# Patient Record
Sex: FEMALE | Race: WHITE | NOT HISPANIC OR LATINO | Employment: FULL TIME
[De-identification: names, ages, dates, MRNs, and addresses within clinical notes are randomized per-mention and may not be internally consistent; named-entity substitution may affect disease eponyms.]

---

## 2017-03-06 DIAGNOSIS — N95.1 SYMPTOMATIC MENOPAUSAL OR FEMALE CLIMACTERIC STATES: ICD-10-CM

## 2017-03-06 RX ORDER — MEDROXYPROGESTERONE ACETATE 2.5 MG/1
2.5 TABLET ORAL DAILY
Qty: 90 TABLET | Refills: 0 | Status: SHIPPED | OUTPATIENT
Start: 2017-03-06 | End: 2017-06-06

## 2017-03-06 RX ORDER — ESTRADIOL 1 MG/1
1 TABLET ORAL DAILY
Qty: 90 TABLET | Refills: 0 | Status: SHIPPED | OUTPATIENT
Start: 2017-03-06 | End: 2017-06-06

## 2017-05-27 ENCOUNTER — HEALTH MAINTENANCE LETTER (OUTPATIENT)
Age: 57
End: 2017-05-27

## 2017-06-06 DIAGNOSIS — N95.1 SYMPTOMATIC MENOPAUSAL OR FEMALE CLIMACTERIC STATES: ICD-10-CM

## 2017-06-06 RX ORDER — MEDROXYPROGESTERONE ACETATE 2.5 MG/1
2.5 TABLET ORAL DAILY
Qty: 30 TABLET | Refills: 0 | Status: SHIPPED | OUTPATIENT
Start: 2017-06-06 | End: 2017-06-16

## 2017-06-06 RX ORDER — ESTRADIOL 1 MG/1
1 TABLET ORAL DAILY
Qty: 30 TABLET | Refills: 0 | Status: SHIPPED | OUTPATIENT
Start: 2017-06-06 | End: 2017-06-16

## 2017-06-06 NOTE — TELEPHONE ENCOUNTER
Signed Prescriptions:                        Disp   Refills    estradiol (ESTRACE) 1 MG tablet            30 tab*0        Sig: Take 1 tablet (1 mg) by mouth daily  Authorizing Provider: MARCE CUMMINGS  Ordering User: KASHMIR CALVO    medroxyPROGESTERone (PROVERA) 2.5 MG [id number] tab*0        Sig: Take 1 tablet (2.5 mg) by mouth daily  Authorizing Provider: MARCE CUMMINGS  Ordering User: KASHMIR CALVO      Patient called asking for refill. Per note from SL in Dec 2016, pt was to schedule an AE in 6 months. Pt scheduled to see ML on 6/16. Refill sent for 30 days.   Kashmir Calvo, RN-BSN

## 2017-06-16 ENCOUNTER — OFFICE VISIT (OUTPATIENT)
Dept: MIDWIFE SERVICES | Facility: CLINIC | Age: 57
End: 2017-06-16
Payer: COMMERCIAL

## 2017-06-16 VITALS
OXYGEN SATURATION: 100 % | SYSTOLIC BLOOD PRESSURE: 117 MMHG | HEIGHT: 59 IN | BODY MASS INDEX: 24.35 KG/M2 | DIASTOLIC BLOOD PRESSURE: 80 MMHG | WEIGHT: 120.8 LBS | TEMPERATURE: 97.5 F | HEART RATE: 61 BPM

## 2017-06-16 DIAGNOSIS — R53.83 FATIGUE, UNSPECIFIED TYPE: ICD-10-CM

## 2017-06-16 DIAGNOSIS — M79.671 PAIN IN BOTH FEET: ICD-10-CM

## 2017-06-16 DIAGNOSIS — Z01.419 WELL WOMAN EXAM: Primary | ICD-10-CM

## 2017-06-16 DIAGNOSIS — M79.672 PAIN IN BOTH FEET: ICD-10-CM

## 2017-06-16 DIAGNOSIS — Z12.31 ENCOUNTER FOR SCREENING MAMMOGRAM FOR BREAST CANCER: ICD-10-CM

## 2017-06-16 DIAGNOSIS — N95.1 SYMPTOMATIC MENOPAUSAL OR FEMALE CLIMACTERIC STATES: ICD-10-CM

## 2017-06-16 LAB
ERYTHROCYTE [DISTWIDTH] IN BLOOD BY AUTOMATED COUNT: 12.9 % (ref 10–15)
HCT VFR BLD AUTO: 43.9 % (ref 35–47)
HGB BLD-MCNC: 14.7 G/DL (ref 11.7–15.7)
MCH RBC QN AUTO: 30.2 PG (ref 26.5–33)
MCHC RBC AUTO-ENTMCNC: 33.5 G/DL (ref 31.5–36.5)
MCV RBC AUTO: 90 FL (ref 78–100)
PLATELET # BLD AUTO: 201 10E9/L (ref 150–450)
RBC # BLD AUTO: 4.86 10E12/L (ref 3.8–5.2)
TSH SERPL DL<=0.005 MIU/L-ACNC: 0.98 MU/L (ref 0.4–4)
VIT B12 SERPL-MCNC: 546 PG/ML (ref 193–986)
WBC # BLD AUTO: 5.2 10E9/L (ref 4–11)

## 2017-06-16 PROCEDURE — 84443 ASSAY THYROID STIM HORMONE: CPT | Performed by: ADVANCED PRACTICE MIDWIFE

## 2017-06-16 PROCEDURE — 82607 VITAMIN B-12: CPT | Performed by: ADVANCED PRACTICE MIDWIFE

## 2017-06-16 PROCEDURE — 99396 PREV VISIT EST AGE 40-64: CPT | Performed by: ADVANCED PRACTICE MIDWIFE

## 2017-06-16 PROCEDURE — 36415 COLL VENOUS BLD VENIPUNCTURE: CPT | Performed by: ADVANCED PRACTICE MIDWIFE

## 2017-06-16 PROCEDURE — 85027 COMPLETE CBC AUTOMATED: CPT | Performed by: ADVANCED PRACTICE MIDWIFE

## 2017-06-16 RX ORDER — MEDROXYPROGESTERONE ACETATE 2.5 MG/1
2.5 TABLET ORAL DAILY
Qty: 90 TABLET | Refills: 3 | Status: SHIPPED | OUTPATIENT
Start: 2017-06-16 | End: 2018-06-22

## 2017-06-16 RX ORDER — ESTRADIOL 1 MG/1
1 TABLET ORAL DAILY
Qty: 90 TABLET | Refills: 3 | Status: SHIPPED | OUTPATIENT
Start: 2017-06-16 | End: 2018-06-22

## 2017-06-16 NOTE — PROGRESS NOTES
"Chief Complaint   Patient presents with     Physical       Initial /80 (BP Location: Left arm, Patient Position: Sitting, Cuff Size: Adult Regular)  Pulse 61  Temp 97.5  F (36.4  C) (Oral)  Ht 4' 10.75\" (1.492 m)  Wt 120 lb 12.8 oz (54.8 kg)  LMP 2011  SpO2 100%  Breastfeeding? No  BMI 24.61 kg/m2 Estimated body mass index is 24.61 kg/(m^2) as calculated from the following:    Height as of this encounter: 4' 10.75\" (1.492 m).    Weight as of this encounter: 120 lb 12.8 oz (54.8 kg).  BP completed using cuff size: regular        The following HM Due: mammogram      The following patient reported/Care Every where data was sent to:  P ABSTRACT QUALITY INITIATIVES [06092]  n/a     patient has appointment for today and orders have been placed    Christine is a 57 year old  who presents for annual exam.   Postmenopausal since 52.  She is having hot flashes, mild and decreased mental sharpness. No vaginal bleeding noted.     Besides routine health maintenance,  she would like to discuss issues since a surgery 5 years ago/ B12 levels, diaphragm cramping when bending over. She reports fatigue ever since a tummy tuck and breast enhancement 5 years ago. Reports she stopped exercising after her surgery and hasn't \"gotten back on the wagon\" yet. She does walk her dogs but has trouble with foot pain that sometimes radiates up her leg and she notices opposite side hip pain, mostly in the morning when getting out of bed.  Her sister had low B12 after a surgery and she is requesting that we check that today. She would like a refill of HRT.  GYNECOLOGIC HISTORY:  Menarche: 13   Age at first intercourse: 20 Number of lifetime partners: more than 6  She is not currently sexually active with male partner(s) and she is currently in monogamous relationship with .    History sexually transmitted infections:Chlamydia, Gonorrhea and Herpes, BV  STI testing offered?  Declined  Estrogen replacement therapy: " Yes -  Oral  ADILENE exposure: No    History of abnormal Pap smear: YES - updated in Problem List and Health Maintenance accordingly  Family history of breast CA: Yes (Please explain): maternal aunt  Family history of uterine/ovarian CA: No  Family history of colon CA: No    HEALTH MAINTENANCE:  Cholesterol: (No components found for: CHOL2 ) History of abnormal lipids: No  Mammo: 2015 . History of abnormal Mammo: No  Regular Self Breast Exams: No  Colonoscopy: 2012 History of abnormal Colonoscopy: No  Dexa: 2015 History of abnormal Dexa: No  Calcium/Vitamin D intake: source:  dairy, salads, some nuts Adequate? Yes  TSH: (No components found for: TSH1 )  Pap; (  Lab Results   Component Value Date    PAP NIL 2015    PAP NIL 2012    PAP NIL 2011      HISTORY:  Obstetric History       T4      L4     SAB0   TAB0   Ectopic0   Multiple0   Live Births0       # Outcome Date GA Lbr Juan Pablo/2nd Weight Sex Delivery Anes PTL Lv   5 Term 07/15/91 40w0d       MEENA   4 Term 08/10/90 40w0d    CS   MEENA   3 Term 84 40w0d       MEENA   2 Term 10/02/82 40w0d       MEENA   1 SAB 80     SAB   DEC        Past Medical History:   Diagnosis Date     ASCUS on Pap smear     + HPV , colp WNL     Cervical high risk human papillomavirus (HPV) DNA test positive     Unable to type     Herpes simplex without mention of complication     no recent outbreaks     Other and unspecified hyperlipidemia     elevated chol, ratio WNL     Papanicolaou smear of cervix with atypical squamous cells of undetermined significance (ASC-US)     +Endometrial cells  and 2002 US neg     Varicella without mention of complication     Chickenpox     Past Surgical History:   Procedure Laterality Date     ABDOMINOPLASTY       C NONSPECIFIC PROCEDURE  82,84,91    Vaginal delivery x3     C NONSPECIFIC PROCEDURE           C NONSPECIFIC PROCEDURE  81    SAB     COLONOSCOPY  14     negative, repeat in 10 years     hernia  age 3 or 4    hernia repair     MAMMOPLASTY AUGMENTATION BILATERAL       Family History   Problem Relation Age of Onset     Respiratory Mother      emphysema     Eye Disorder Mother      glaucoma     Psychotic Disorder Mother      depression     Depression Mother      Hypertension Father      CANCER Father      pancreatic CA-  72     Other - See Comments Sister      fibromyalgia     DIABETES Maternal Grandfather      GASTROINTESTINAL DISEASE Daughter      ulcers     Breast Cancer Maternal Aunt      Unsure what age or outcome     Alcohol/Drug Brother      Alcohol     Liver Disease Brother      Depression Daughter      Other - See Comments Other      similar to MS     Social History     Social History     Marital status:      Spouse name: N/A     Number of children: 4     Years of education: 12     Social History Main Topics     Smoking status: Never Smoker     Smokeless tobacco: Never Used     Alcohol use Yes      Comment: 1-2 per month     Drug use: No     Sexual activity: Yes     Partners: Male     Birth control/ protection: Surgical, Post-menopausal      Comment: vasectomy     Other Topics Concern      Service No     Blood Transfusions No     Caffeine Concern No     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 No     Social History Narrative    Caffeine intake/servings daily - 0-1 cup of coffee    Calcium intake/servings daily - 0    Exercise 0 times weekly - describe weights, going to start again    Sunscreen used - No    Seatbelts used - Yes    Guns stored in the home - No    Self Breast Exam - No    Pap test up to date -  Yes, as of today    Eye exam up  "to date -  No    Dental exam up to date -  Yes    DEXA scan up to date -  Not Applicable    Flex Sig/Colonoscopy up to date -  Not Applicable    Mammography up to date -  No    Immunizations reviewed and up to date - Unsure    Abuse: Current or Past (Physical, Sexual or Emotional) - No    Do you feel safe in your environment - Yes    Do you cope well with stress - Yes    Do you suffer from insomnia - No    Last updated by: Natali Osobrne MA 9/16/11                           Current Outpatient Prescriptions:      estradiol (ESTRACE) 1 MG tablet, Take 1 tablet (1 mg) by mouth daily, Disp: 30 tablet, Rfl: 0     medroxyPROGESTERone (PROVERA) 2.5 MG tablet, Take 1 tablet (2.5 mg) by mouth daily, Disp: 30 tablet, Rfl: 0     Probiotic Product (PROBIOTIC PO), Take  by mouth., Disp: , Rfl:      Allergies   Allergen Reactions     No Known Drug Allergies        Past medical, surgical, social and family history were reviewed and updated in EPIC.    ROS:   C:       NEGATIVE for fever, chills, change in weight  I:         NEGATIVE for worrisome rashes, moles or lesions  E:       NEGATIVE for vision changes or irritation  E/M:   NEGATIVE for ear, mouth and throat problems  R:       NEGATIVE for significant cough or SOB  CV:     NEGATIVE for chest pain, palpitations or peripheral edema  GI:      NEGATIVE for nausea, abdominal pain, heartburn, or change in bowel habits  :    NEGATIVE for frequency, dysuria, hematuria, vaginal discharge, or bleeding  M:       NEGATIVE for significant arthralgias or myalgia  N:       NEGATIVE for weakness, dizziness or paresthesias  E:       NEGATIVE for temperature intolerance, skin/hair changes  P:       NEGATIVE for changes in mood or affect    EXAM:  /80 (BP Location: Left arm, Patient Position: Sitting, Cuff Size: Adult Regular)  Pulse 61  Temp 97.5  F (36.4  C) (Oral)  Ht 4' 10.75\" (1.492 m)  Wt 120 lb 12.8 oz (54.8 kg)  LMP 12/01/2011  SpO2 100%  Breastfeeding? No  BMI 24.61 kg/m2 "   BMI: Body mass index is 24.61 kg/(m^2).  Constitutional: healthy, alert and no distress  Head: Normocephalic. No masses, lesions, tenderness or abnormalities  Neck: Neck supple. Trachea midline. No adenopathy. Thyroid symmetric, normal size.   Cardiovascular: RRR.   Respiratory: Negative.   Breast: Breasts reveal mild symmetric fibrocystic densities, but there are no dominant, discrete, fixed or suspicious masses found. Implants palpated bilaterally.  Gastrointestinal: Abdomen soft, non-tender, non-distended. No masses, organomegaly  Musculoskeletal: extremities normal  Skin: no suspicious lesions or rashes  Psychiatric: Affect appropriate, cooperative,mentation appears normal.     COUNSELING:        Regular exercise       Healthy diet/nutrition       Osteoporosis Prevention/Bone Health   reports that she has never smoked. She has never used smokeless tobacco.    Body mass index is 24.61 kg/(m^2).    HRT - need for mammogram  Menopause as likely cause for symptoms as opposed to surgery 5 years ago - just happened to coincide with menopause  ASSESSMENT:  57 year old  with satisfactory annual exam  (Z12.31) Encounter for screening mammogram for breast cancer  (primary encounter diagnosis)  Comment:   Plan: MA Screening Digital Bilateral - patient will schedule ASAP, understands that it is important when taking HRT             (R53.83) Fatigue, unspecified type  Comment: Encouraged and discussed sustainable exercise to increase energy, lose weight and overall feel better  Plan: TSH with free T4 reflex, CBC with platelets,         Vitamin B12            (N95.1) Symptomatic menopausal or female climacteric states  Comment:   Plan: estradiol (ESTRACE) 1 MG tablet,         medroxyPROGESTERone (PROVERA) 2.5 MG tablet            (M79.671,  M79.672) Pain in both feet  Comment: Referral as an option to assess foot pain  Plan: PHYSICAL THERAPY REFERRAL    RTC yearly for annual exam  Pap due in 2020  Recommended  Whitney Grover book - [place] of Menopause  Will notify patient of lab results via letter or phone call if f/u needed.  Jocelyne Godinez CNM

## 2017-06-16 NOTE — MR AVS SNAPSHOT
"              After Visit Summary   6/16/2017    Christine Yadav    MRN: [id number]           Patient Information     Date Of Birth          1960        Visit Information        Provider Department      6/16/2017 9:30 AM [facility]        Today's Diagnoses     Well woman exam    -  1    Encounter for screening mammogram for breast cancer        Fatigue, unspecified type        Symptomatic menopausal or female climacteric states        Pain in both feet           Follow-ups after your visit        Additional Services     PHYSICAL THERAPY REFERRAL       *This therapy referral will be filtered to a centralized scheduling office at [facility] and the patient will receive a call to schedule an appointment at a [facility] location most convenient for them. *     [facility] provides Physical Therapy evaluation and treatment and many specialty services across the [facility] system.  If requesting a specialty program, please choose from the list below.    If you have not heard from the scheduling office within 2 business days, please call [phone] for all locations, with the exception of [place], please call [phone].  Treatment: Evaluation & Treatment  Special Instructions/Modalities: none  Special Programs: None    Please be aware that coverage of these services is subject to the terms and limitations of your health insurance plan.  Call member services at your health plan with any benefit or coverage questions.      **Note to Provider:  If you are referring outside of [facility] for the therapy appointment, please list the name of the location in the \"special instructions\" above, print the referral and give to the patient to schedule the appointment.                  Future tests that were ordered for you today     Open Future Orders        Priority Expected Expires Ordered    MA Screening Digital Bilateral Routine 6/16/2017 " "2018            Who to contact     If you have questions or need follow up information about today's clinic visit or your schedule please contact [facility] directly at [phone].  Normal or non-critical lab and imaging results will be communicated to you by MyChart, letter or phone within 4 business days after the clinic has received the results. If you do not hear from us within 7 days, please contact the clinic through MyChart or phone. If you have a critical or abnormal lab result, we will notify you by phone as soon as possible.  Submit refill requests through [employer] computer or call your pharmacy and they will forward the refill request to us. Please allow 3 business days for your refill to be completed.          Additional Information About Your Visit        [employer] Information     [employer] computer lets you send messages to your doctor, view your test results, renew your prescriptions, schedule appointments and more. To sign up, go to [email or web address] . Click on \"Log in\" on the left side of the screen, which will take you to the Welcome page. Then click on \"Sign up Now\" on the right side of the page.     You will be asked to enter the access code listed below, as well as some personal information. Please follow the directions to create your username and password.     Your access code is: [email or web address]  Expires: 2017 11:23 AM     Your access code will  in 90 days. If you need help or a new code, please call your [facility] or [phone].        Care EveryWhere ID     This is your Care EveryWhere ID. This could be used by other organizations to access your [facility] medical records  [id number]        Your Vitals Were     Pulse Temperature Height Last Period Pulse Oximetry Breastfeeding?    61 97.5  F (36.4  C) (Oral) 4' 10.75\" (1.492 m) 2011 100% No    BMI (Body Mass Index)                   24.61 kg/m2            Blood Pressure from Last 3 Encounters:   17 " 117/80   07/09/15 103/71   07/07/15 120/84    Weight from Last 3 Encounters:   06/16/17 120 lb 12.8 oz (54.8 kg)   07/09/15 137 lb (62.1 kg)   07/07/15 134 lb 8 oz (61 kg)              We Performed the Following     CBC with platelets     PHYSICAL THERAPY REFERRAL     TSH with free T4 reflex     Vitamin B12          Today's Medication Changes          These changes are accurate as of: 6/16/17 11:23 AM.  If you have any questions, ask your nurse or doctor.               Stop taking these medicines if you haven't already. Please contact your care team if you have questions.     clobetasol 0.05 % ointment   Commonly known as:  TEMOVATE   Stopped by:  Jocelyne Godinez APRN CNM           estrogen (conjugated)-medroxyPROGESTERone 0.3-1.5 MG per tablet   Commonly known as:  PREMPRO   Stopped by:  Jocelyne Godinez APRN CNM           naproxen 500 MG tablet   Commonly known as:  NAPROSYN   Stopped by:  Jocelyne Godinez APRN CNM           venlafaxine 37.5 MG tablet   Commonly known as:  EFFEXOR   Stopped by:  Jocelyne Godinez APRN CNM           venlafaxine 75 MG tablet   Commonly known as:  EFFEXOR   Stopped by:  Jocelyne Godinez APRN CNM                Where to get your medicines      These medications were sent to [facility] [address], [facility] [address]  [address], [place] [address], [facility] [address]     Phone:  [phone]     estradiol 1 MG tablet    medroxyPROGESTERone 2.5 MG tablet                Primary Care Provider Office Phone # Fax #    Nella Taylor [phone]       [facility] [address]  [facility] [address]        Thank you!     Thank you for choosing [facility]  for your care. Our goal is always to provide you with excellent care. Hearing back from our patients is one way we can continue to improve our services. Please take a few minutes to complete the written survey that you may receive in the mail after your  visit with us. Thank you!             Your Updated Medication List - Protect others around you: Learn how to safely use, store and throw away your medicines at www.disposemymeds.org.          This list is accurate as of: 6/16/17 11:23 AM.  Always use your most recent med list.                   Brand Name Dispense Instructions for use    estradiol 1 MG tablet    ESTRACE    90 tablet    Take 1 tablet (1 mg) by mouth daily       medroxyPROGESTERone 2.5 MG tablet    PROVERA    90 tablet    Take 1 tablet (2.5 mg) by mouth daily       PROBIOTIC PO      Take  by mouth.

## 2017-06-16 NOTE — LETTER
[facility]  [address] [place] [address] [address]  [facility] [address] [phone]        June 19, 2017      Christine Yadav  [address]  [facility] [address]          Dear Ms. Yadav,    The results of your recent lab tests were within normal limits. Enclosed is a copy of these results.  If you have any further questions or problems, please contact our office at [phone].  Component      Latest Ref Rng & Units 6/16/2017   WBC      4.0 - 11.0 10e9/L 5.2   RBC Count      3.8 - 5.2 10e12/L 4.86   Hemoglobin      11.7 - 15.7 g/dL 14.7   Hematocrit      35.0 - 47.0 % 43.9   MCV      78 - 100 fl 90   MCH      26.5 - 33.0 pg 30.2   MCHC      31.5 - 36.5 g/dL 33.5   RDW      10.0 - 15.0 % 12.9   Platelet Count      150 - 450 10e9/L 201   TSH      0.40 - 4.00 mU/L 0.98   Vitamin B12      193 - 986 pg/mL 546     Sincerely,        Jocelyne Godinez CNM/angel

## 2017-08-25 ENCOUNTER — OFFICE VISIT (OUTPATIENT)
Dept: FAMILY MEDICINE | Facility: CLINIC | Age: 57
End: 2017-08-25
Payer: COMMERCIAL

## 2017-08-25 VITALS
BODY MASS INDEX: 24.6 KG/M2 | SYSTOLIC BLOOD PRESSURE: 90 MMHG | HEART RATE: 64 BPM | TEMPERATURE: 98 F | OXYGEN SATURATION: 98 % | HEIGHT: 59 IN | WEIGHT: 122 LBS | DIASTOLIC BLOOD PRESSURE: 64 MMHG

## 2017-08-25 DIAGNOSIS — Z11.59 NEED FOR HEPATITIS C SCREENING TEST: ICD-10-CM

## 2017-08-25 DIAGNOSIS — M79.675 PAIN OF TOE OF LEFT FOOT: Primary | ICD-10-CM

## 2017-08-25 DIAGNOSIS — L03.032 PARONYCHIA OF GREAT TOE, LEFT: ICD-10-CM

## 2017-08-25 PROCEDURE — 99213 OFFICE O/P EST LOW 20 MIN: CPT | Performed by: PHYSICIAN ASSISTANT

## 2017-08-25 PROCEDURE — 36415 COLL VENOUS BLD VENIPUNCTURE: CPT | Performed by: PHYSICIAN ASSISTANT

## 2017-08-25 PROCEDURE — 86803 HEPATITIS C AB TEST: CPT | Performed by: PHYSICIAN ASSISTANT

## 2017-08-25 RX ORDER — CEPHALEXIN 500 MG/1
500 CAPSULE ORAL 4 TIMES DAILY
Qty: 20 CAPSULE | Refills: 0 | Status: SHIPPED | OUTPATIENT
Start: 2017-08-25 | End: 2017-08-30

## 2017-08-25 ASSESSMENT — PAIN SCALES - GENERAL: PAINLEVEL: MODERATE PAIN (4)

## 2017-08-25 NOTE — MR AVS SNAPSHOT
After Visit Summary   8/25/2017    Christine Yadav    MRN: [id number]           Patient Information     Date Of Birth          1960        Visit Information        Provider Department      8/25/2017 11:20 AM Tato Beebe PA-C [facility]        Today's Diagnoses     Pain of toe of left foot    -  1    Paronychia of great toe, left        Need for hepatitis C screening test          Care Instructions      Paronychia of the Finger or Toe  Paronychia is an infection near a fingernail or toenail. It usually occurs when an opening in the cuticle or an ingrown toenail lets bacteria under the skin.  The infection will need to be drained if pus is present. If the infection has been caught early, you may need only antibiotic treatment. Healing will take about 1 to 2 weeks.  Home care  Follow these guidelines when caring for yourself at home:    Clean and soak the toe or finger. Do this 2 times a day for the first 3 days. To do so:    Soak your foot or hand in a tub of warm water for 5 minutes. Or hold your toe or finger under a faucet of warm running water for 5 minutes.    Clean any crust away with soap and water using a cotton swab.    Put antibiotic ointment on the infected area.    Change the dressing daily or any time it gets dirty.    If you were given antibiotics, take them as directed until they are all gone.    If your infection is on a toe, wear comfortable shoes with a lot of toe room. You can also wear open-toed sandals while your toe heals.    You may use over-the-counter medicine (acetaminophen or ibuprofen to help with pain, unless another medicine was prescribed. If you have chronic liver or kidney disease, talk with your healthcare provider before using these medicines. Also talk with your provider if you've had a stomach ulcer or GI (gastrointestinal) bleeding.  Prevention  The following can prevent paronychia:    Avoid cutting or playing with your cuticles at  home.    Don't bite your nails.    Don't suck on your thumbs or fingers.  Follow-up care  Follow up with your healthcare provider, or as advised.  When to seek medical advice  Call your healthcare provider right away if any of these occur:    Redness, pain, or swelling of the finger or toe gets worse    Red streaks in the skin leading away from the wound    Pus or fluid draining from the nail area    Fever of 100.4 F (38 C) or higher, or as directed by your provider  Date Last Reviewed: 8/1/2016 2000-2017 [employer]. [address] [place] [address]. All rights reserved. This information is not intended as a substitute for professional medical care. Always follow your healthcare professional's instructions.                Follow-ups after your visit        Additional Services     PODIATRY/FOOT & ANKLE SURGERY REFERRAL       Your provider has referred you to: [facility]: [facility] [phone]   [email or web address]    Please be aware that coverage of these services is subject to the terms and limitations of your health insurance plan.  Call member services at your health plan with any benefit or coverage questions.      Please bring the following to your appointment:  >>   Any x-rays, CTs or MRIs which have been performed.  Contact the facility where they were done to arrange for  prior to your scheduled appointment.    >>   List of current medications   >>   This referral request   >>   Any documents/labs given to you for this referral                  Who to contact     If you have questions or need follow up information about today's clinic visit or your schedule please contact [facility] directly at [phone].  Normal or non-critical lab and imaging results will be communicated to you by MyChart, letter or phone within 4 business days after the clinic has received the results. If you do not hear from  "us within 7 days, please contact the clinic through [employer] or phone. If you have a critical or abnormal lab result, we will notify you by phone as soon as possible.  Submit refill requests through [employer] or call your pharmacy and they will forward the refill request to us. Please allow 3 business days for your refill to be completed.          Additional Information About Your Visit        [employer] Information     [employer] lets you send messages to your doctor, view your test results, renew your prescriptions, schedule appointments and more. To sign up, go to [email or web address] . Click on \"Log in\" on the left side of the screen, which will take you to the Welcome page. Then click on \"Sign up Now\" on the right side of the page.     You will be asked to enter the access code listed below, as well as some personal information. Please follow the directions to create your username and password.     Your access code is: [email or web address]  Expires: 2017 11:23 AM     Your access code will  in 90 days. If you need help or a new code, please call your [place] clinic or [phone].        Care EveryWhere ID     This is your Care EveryWhere ID. This could be used by other organizations to access your [place] medical records  [id number]        Your Vitals Were     Pulse Temperature Height Last Period Pulse Oximetry Breastfeeding?    64 98  F (36.7  C) (Oral) 4' 10.75\" (1.492 m) 2011 98% No    BMI (Body Mass Index)                   24.85 kg/m2            Blood Pressure from Last 3 Encounters:   17 90/64   17 117/80   07/09/15 103/71    Weight from Last 3 Encounters:   17 122 lb (55.3 kg)   17 120 lb 12.8 oz (54.8 kg)   07/09/15 137 lb (62.1 kg)              We Performed the Following     Hepatitis C Screen Reflex to HCV RNA Quant and Genotype     PODIATRY/FOOT & ANKLE SURGERY REFERRAL          Today's Medication Changes          These changes are accurate as of: 17 11:56 AM.  If " you have any questions, ask your nurse or doctor.               Start taking these medicines.        Dose/Directions    cephALEXin 500 MG capsule   Commonly known as:  KEFLEX   Used for:  Paronychia of great toe, left   Started by:  Tato Beebe PA-C        Dose:  500 mg   Take 1 capsule (500 mg) by mouth 4 times daily for 5 days   Quantity:  20 capsule   Refills:  0            Where to get your medicines      These [place] [facility] [place] [address]  [address], [facility] [address]     Phone:  [phone]     cephALEXin 500 MG capsule                Primary Care Provider Office Phone # Fax #    Nella Taylor [phone] [address]  [facility] [address]        Equal Access to Services     [facility]: Hadii jose ku hadasho Soomaali, waaxda luqadaha, qaybta kaalmada adeegyada, waxjoy morel . So [facility] [phone].    ATENCIÓN: Si habla español, tiene a schneider disposición servicios gratuitos de asistencia lingüística. [facility] [phone].    We comply with applicable federal civil rights laws and [place] laws. We do not discriminate on the basis of race, color, national origin, age, disability sex, sexual orientation or gender identity.            Thank you!     Thank you for choosing [facility]  for your care. Our goal is always to provide you with excellent care. Hearing back from our patients is one way we can continue to improve our services. Please take a few minutes to complete the written survey that you may receive in the mail after your visit with us. Thank you!             Your Updated Medication List - Protect others around you: Learn how to safely use, store and throw away your medicines at www.disposemymeds.org.          This list is accurate as of: 8/25/17 11:56 AM.  Always use your most recent med list.                   Brand Name Dispense  Instructions for use Diagnosis    cephALEXin 500 MG capsule    KEFLEX    20 capsule    Take 1 capsule (500 mg) by mouth 4 times daily for 5 days    Paronychia of great toe, left       estradiol 1 MG tablet    ESTRACE    90 tablet    Take 1 tablet (1 mg) by mouth daily    Symptomatic menopausal or female climacteric states       medroxyPROGESTERone 2.5 MG tablet    PROVERA    90 tablet    Take 1 tablet (2.5 mg) by mouth daily    Symptomatic menopausal or female climacteric states       PROBIOTIC PO      Take  by mouth.    Vaginal odor

## 2017-08-25 NOTE — NURSING NOTE
"Chief Complaint   Patient presents with     Foot Pain       Initial BP 90/64 (BP Location: Left arm, Patient Position: Chair, Cuff Size: Adult Regular)  Pulse 64  Temp 98  F (36.7  C) (Oral)  Ht 4' 10.75\" (1.492 m)  Wt 122 lb (55.3 kg)  LMP 12/01/2011  SpO2 98%  Breastfeeding? No  BMI 24.85 kg/m2 Estimated body mass index is 24.85 kg/(m^2) as calculated from the following:    Height as of this encounter: 4' 10.75\" (1.492 m).    Weight as of this encounter: 122 lb (55.3 kg).  Medication Reconciliation: complete     Jake Osborne CMA    "

## 2017-08-25 NOTE — LETTER
August 29, 2017      Christine Yadav  [address] [facility] [address]        Dear ,    We are writing to inform you of your test results.    These are normal results.  Follow up as previously recommended.    Resulted Orders   Hepatitis C Screen Reflex to HCV RNA Quant and Genotype   Result Value Ref Range    Hepatitis C Antibody Nonreactive NR^Nonreactive      Comment:      Assay performance characteristics have not been established for newborns,   infants, and children         If you have any questions or concerns, please call the clinic at the number listed above.       Sincerely,        Ricky GALLOWAY/perlita

## 2017-08-25 NOTE — PROGRESS NOTES
"  SUBJECTIVE:   Christine Yadav is a 57 year old female who presents to clinic today for the following health issues:    Concern - Left foot pain  Onset: >6months    Description:   Patient complains of pain left foot. Started on big toe then radiating to bottom of toe, feels like the bone doesn't have any cushion when foot presses against the floor.    Intensity: 3-4/10    Progression of Symptoms:  worsened    Accompanying Signs & Symptoms:  Top of toe numb    Previous history of similar problem:   Yes the top of bilateral feet - saw chiropractor for it and it helps. Pain due to spine not aligned.    Precipitating factors:   Worsened by: Walking/standing, pressure on bottom of toe    Alleviating factors:  Improved by: Warm water and Epson abby    Therapies Tried and outcome: Soaking warm and Epson salt,      Problem list and histories reviewed & adjusted, as indicated.  Additional history: After extensive Hx review determined that Patient has a PMHx of paronyhcia of both halluces and this seems to be flaring.  Accompanied by pain on the ball of the foot on L that has started more recently.  The pain on the top of the foot as per above is historical.  Patient drinks wine daily and will start a MVI.     Reviewed and updated as needed this visit by clinical staffTobacco  Allergies  Meds       ROS:  Constitutional, HEENT, cardiovascular, pulmonary, gi and gu systems are negative, except as otherwise noted.      OBJECTIVE:   BP 90/64 (BP Location: Left arm, Patient Position: Chair, Cuff Size: Adult Regular)  Pulse 64  Temp 98  F (36.7  C) (Oral)  Ht 4' 10.75\" (1.492 m)  Wt 122 lb (55.3 kg)  LMP 12/01/2011  SpO2 98%  Breastfeeding? No  BMI 24.85 kg/m2  Body mass index is 24.85 kg/(m^2).  GENERAL: healthy, alert and no distress  MS: LE/FEET exam shows normal strength and muscle mass, no deformities, no erythema, induration, or nodules, no evidence of joint effusion, ROM of all joints is normal and no evidence of " joint instability  SKIN: sl swelling of medial nail bed L hallux. Tender to palpation with no erythema or suppuration.     Diagnostic Test Results:  none     ASSESSMENT/PLAN:   1. Pain of toe of left foot  - PODIATRY/FOOT & ANKLE SURGERY REFERRAL    2. Paronychia of great toe, left  - cephALEXin (KEFLEX) 500 MG capsule; Take 1 capsule (500 mg) by mouth 4 times daily for 5 days  Dispense: 20 capsule; Refill: 0    3. Need for hepatitis C screening test  - Hepatitis C Screen Reflex to HCV RNA Quant and Genotype    Use medication as directed.  Continue supportive OTC measures.  Follow up per Podiatry  Patient amenable to this follow up plan.     Tato Beebe PA-C  [facility]

## 2017-08-25 NOTE — PATIENT INSTRUCTIONS
Paronychia of the Finger or Toe  Paronychia is an infection near a fingernail or toenail. It usually occurs when an opening in the cuticle or an ingrown toenail lets bacteria under the skin.  The infection will need to be drained if pus is present. If the infection has been caught early, you may need only antibiotic treatment. Healing will take about 1 to 2 weeks.  Home care  Follow these guidelines when caring for yourself at home:    Clean and soak the toe or finger. Do this 2 times a day for the first 3 days. To do so:    Soak your foot or hand in a tub of warm water for 5 minutes. Or hold your toe or finger under a faucet of warm running water for 5 minutes.    Clean any crust away with soap and water using a cotton swab.    Put antibiotic ointment on the infected area.    Change the dressing daily or any time it gets dirty.    If you were given antibiotics, take them as directed until they are all gone.    If your infection is on a toe, wear comfortable shoes with a lot of toe room. You can also wear open-toed sandals while your toe heals.    You may use over-the-counter medicine (acetaminophen or ibuprofen to help with pain, unless another medicine was prescribed. If you have chronic liver or kidney disease, talk with your healthcare provider before using these medicines. Also talk with your provider if you've had a stomach ulcer or GI (gastrointestinal) bleeding.  Prevention  The following can prevent paronychia:    Avoid cutting or playing with your cuticles at home.    Don't bite your nails.    Don't suck on your thumbs or fingers.  Follow-up care  Follow up with your healthcare provider, or as advised.  When to seek medical advice  Call your healthcare provider right away if any of these occur:    Redness, pain, or swelling of the finger or toe gets worse    Red streaks in the skin leading away from the wound    Pus or fluid draining from the nail area    Fever of 100.4 F (38 C) or higher, or as directed  by your provider  Date Last Reviewed: 8/1/2016 2000-2017 [employer]. [address] [place] [address], [place] [address]. All rights reserved. This information is not intended as a substitute for professional medical care. Always follow your healthcare professional's instructions.

## 2017-08-27 LAB — HCV AB SERPL QL IA: NONREACTIVE

## 2017-09-08 ENCOUNTER — OFFICE VISIT (OUTPATIENT)
Dept: PODIATRY | Facility: CLINIC | Age: 57
End: 2017-09-08
Payer: COMMERCIAL

## 2017-09-08 VITALS — WEIGHT: 120 LBS | BODY MASS INDEX: 24.19 KG/M2 | RESPIRATION RATE: 14 BRPM | HEIGHT: 59 IN

## 2017-09-08 DIAGNOSIS — M25.80 SESAMOIDITIS: Primary | ICD-10-CM

## 2017-09-08 DIAGNOSIS — L60.0 INGROWING NAIL: ICD-10-CM

## 2017-09-08 PROCEDURE — 99203 OFFICE O/P NEW LOW 30 MIN: CPT | Performed by: PODIATRIST

## 2017-09-08 NOTE — NURSING NOTE
"Chief Complaint   Patient presents with     Consult     Pain in bottom of left foot        Initial Resp 14  Ht 1.492 m (4' 10.75\")  Wt 54.4 kg (120 lb)  LMP 12/01/2011  BMI 24.44 kg/m2 Estimated body mass index is 24.44 kg/(m^2) as calculated from the following:    Height as of this encounter: 1.492 m (4' 10.75\").    Weight as of this encounter: 54.4 kg (120 lb).  Medication Reconciliation: complete   Wendy Talbert CMA 9/8/2017 9:50 AM      "

## 2017-09-08 NOTE — PATIENT INSTRUCTIONS
We wish you continued good healing. If you have any questions or concerns, please do not hesitate to contact us at [phone]      Please remember to call and schedule a follow up appointment if one was recommended at your earliest convenience.   PODIATRY CLINIC HOURS  TELEPHONE NUMBER    Dr. Anton Lorenz D.P.M [facility]    Clinics:  [facility]        Briana Vela MA  Medical Assistant  Tuesday 1PM-6PM  [facility]  Wednesday 7AM-2PM  [place]  Thursday 10AM-6PM  [place] Friday 7AM-345PM  [place]  Specialty schedulers:   [phone] to make an appointment with any Specialty Provider.        Urgent Care locations:    [facility] Monday-Friday 5 pm - 9 pm. Saturday-Sunday 9 am -5pm    Monday-Friday 11 am - 9 pm Saturday 9 am - 5 pm     Monday-Sunday 12 noon-8PM [phone]     If you need a medication refill, please contact us you may need lab work and/or a follow up visit prior to your refill (i.e. Antifungal medications).    [employer] (secure e-mail communication and access to your chart) to send a message or to make an appointment.    If MRI needed please call [facility] at [phone]          Weight management plan: Patient was referred to their PCP to discuss a diet and exercise plan.

## 2017-09-08 NOTE — PROGRESS NOTES
S:  Patient see in consult from Tato Beebe and complains of left foot pain.  Points to plantar medial sesamoid bone.  Describes as a burning pain.  aggravated by activity and relieved by rest.  Has had this for 6 months.  No pain anywhere else on feet.  Goes barefoot around house.  Feels as though she has no cushion here.  Recent work shoes very inexpensive.  On her feet as nurse.  Socks around house.  She also has ingrown nail left hallux medial that waxes and wanes.            ROS:  Denies edema, erythema, ecchymosis, numbness, or drainage.  Denies weakness or toe deformity.       Allergies   Allergen Reactions     No Known Drug Allergies        Current Outpatient Prescriptions   Medication Sig Dispense Refill     estradiol (ESTRACE) 1 MG tablet Take 1 tablet (1 mg) by mouth daily 90 tablet 3     medroxyPROGESTERone (PROVERA) 2.5 MG tablet Take 1 tablet (2.5 mg) by mouth daily 90 tablet 3     Probiotic Product (PROBIOTIC PO) Take  by mouth.         Patient Active Problem List   Diagnosis     Cervical high risk human papillomavirus (HPV) DNA test positive     Herpes simplex virus (HSV) infection     Contraceptive management     Other abnormal Papanicolaou smear of cervix and cervical HPV     CARDIOVASCULAR SCREENING; LDL GOAL LESS THAN 160     Menopause syndrome       Past Medical History:   Diagnosis Date     ASCUS on Pap smear 7/07    + HPV , colp WNL     Cervical high risk human papillomavirus (HPV) DNA test positive 6/05    Unable to type     Herpes simplex without mention of complication     no recent outbreaks     Other and unspecified hyperlipidemia     elevated chol, ratio WNL     Papanicolaou smear of cervix with atypical squamous cells of undetermined significance (ASC-US) 6/05    +Endometrial cells 12/06 and 6/06, 2002 US neg     Varicella without mention of complication     Chickenpox       Past Surgical History:   Procedure Laterality Date     ABDOMINOPLASTY       C NONSPECIFIC PROCEDURE  82,84,91     "Vaginal delivery x3     C NONSPECIFIC PROCEDURE           C NONSPECIFIC PROCEDURE  83    SAB     COLONOSCOPY  14    negative, repeat in 10 years     hernia  age 3 or 4    hernia repair     MAMMOPLASTY AUGMENTATION BILATERAL         Family History   Problem Relation Age of Onset     Respiratory Mother      emphysema     Eye Disorder Mother      glaucoma     Psychotic Disorder Mother      depression     Depression Mother      CANCER Mother      Hypertension Father      CANCER Father      pancreatic CA-  72     Other - See Comments Sister      fibromyalgia     DIABETES Maternal Grandfather      GASTROINTESTINAL DISEASE Daughter      ulcers     Breast Cancer Maternal Aunt      Unsure what age or outcome     Alcohol/Drug Brother      Alcohol     Liver Disease Brother      Depression Daughter      Other - See Comments Other      similar to MS       Social History   Substance Use Topics     Smoking status: Never Smoker     Smokeless tobacco: Never Used     Alcohol use Yes      Comment: 1-2 per month         O: Resp 14  Ht 1.492 m (4' 10.75\")  Wt 54.4 kg (120 lb)  LMP 2011  BMI 24.44 kg/m2.  Patient good historian.  A&O X3.  Pulses DP, PT 2/4 b/l.  CRT < 3 seconds X 10 digits.  No edema or varicosities noted.  Sensation to light touch intact b/l.  Reflexes 2/4 b/l.  Skin has normal texture and turgor b/l.  Left hallux medial border slight erythema.  No drainage.  Cavus arch with weightbearing.  No forefoot or rear foot deformities noted.  MS 5/5 all compartments.  Normal ROM all fore foot and rearfoot joints.  No equinus.  Pain under left medial sesamoid bone.  Negative Lachmans test.    No pain anywhere else.  No erythema, edema, ecchymosis, or subcutaneous masses noted.      A:  Left medial sesamoiditis.       Left hallux medial border ingrown nail    P:   Discussed cause of sesamoiditis with patient.  Ice bid.  Instructed to wear stiff soles shoes at all times.  stiff house shoes and made " suggestions.   Dancers pad to offload this.  Will continue soaking nail.  Instructed on how to place cotton to offload.  Next step avulsion.  Return to clinic prn.  Thank you for allowing me participate in the care of this patient.        Anton Lorenz DPM, FACFAS

## 2017-09-08 NOTE — MR AVS SNAPSHOT
After Visit Summary   9/8/2017    Christine Yadav    MRN: [id number]           Patient Information     Date Of Birth          1960        Visit Information        Provider Department      9/8/2017 9:45 AM Anton Lorenz, DPMEENU [facility]        Care Instructions    We wish you continued good healing. If you have any questions or concerns, please do not hesitate to contact us at [phone]      Please remember to call and schedule a follow up appointment if one was recommended at your earliest convenience.   PODIATRY CLINIC HOURS  TELEPHONE NUMBER    Dr. Anton ROTHPAMANDA [facility]    Clinics:  [facility]        Briana Vela MA  Medical Assistant  Tuesday 1PM-6PM  [facility]  Wednesday 7AM-2PM  [place]  Thursday 10AM-6PM  [place]  Friday 7AM-345PM  [place]  Specialty schedulers:   [phone] to make an appointment with any Specialty Provider.        Urgent Care locations:    [facility] Monday-Friday 5 pm - 9 pm. Saturday-Sunday 9 am -5pm    Monday-Friday 11 am - 9 pm Saturday 9 am - 5 pm     Monday-Sunday 12 noon-8PM [phone]     If you need a medication refill, please contact us you may need lab work and/or a follow up visit prior to your refill (i.e. Antifungal medications).    [employer] (secure e-mail communication and access to your chart) to send a message or to make an appointment.    If MRI needed please call [facility] at [phone]                    Follow-ups after your visit        Who to contact     If you have questions or need follow up information about today's clinic visit or your schedule please contact [facility] directly at [phone].  Normal or non-critical lab and imaging results will be communicated to you by MyChart, letter or phone within 4 business days after the  "clinic has received the results. If you do not hear from us within 7 days, please contact the clinic through [employer] or phone. If you have a critical or abnormal lab result, we will notify you by phone as soon as possible.  Submit refill requests through [employer] or call your pharmacy and they will forward the refill request to us. Please allow 3 business days for your refill to be completed.          Additional Information About Your Visit        [employer] Information     [employer] lets you send messages to your doctor, view your test results, renew your prescriptions, schedule appointments and more. To sign up, go to [email or web address] . Click on \"Log in\" on the left side of the screen, which will take you to the Welcome page. Then click on \"Sign up Now\" on the right side of the page.     You will be asked to enter the access code listed below, as well as some personal information. Please follow the directions to create your username and password.     Your access code is: [email or web address]  Expires: 2017 11:23 AM     Your access code will  in 90 days. If you need help or a new code, please call your [place] clinic or [phone].        Care EveryWhere ID     This is your Care EveryWhere ID. This could be used by other organizations to access your [place] medical records  [id number]        Your Vitals Were     Respirations Height Last Period BMI (Body Mass Index)          14 1.492 m (4' 10.75\") 2011 24.44 kg/m2         Blood Pressure from Last 3 Encounters:   17 90/64   17 117/80   07/09/15 103/71    Weight from Last 3 Encounters:   17 54.4 kg (120 lb)   17 55.3 kg (122 lb)   17 54.8 kg (120 lb 12.8 oz)              Today, you had the following     No orders found for display       Primary Care Provider Office Phone # Fax #    Nella Taylor [phone] [address] [facility] [address]        Equal Access to Services     LUISANA GIRON AH: " Hadii jose drake Sokarynali, waaxda luqadaha, qaybta kaalquique iverson, jonny elisain hayaaalta kingbrandy hurtado latrevaalta saad. So [facility] [phone].    ATENCIÓN: Si habla ramana, tiene a schneider disposición servicios gratuitos de asistencia lingüística. Llame al [phone].    We comply with applicable federal civil rights laws and [place] laws. We do not discriminate on the basis of race, color, national origin, age, disability sex, sexual orientation or gender identity.            Thank you!     Thank you for choosing [facility]  for your care. Our goal is always to provide you with excellent care. Hearing back from our patients is one way we can continue to improve our services. Please take a few minutes to complete the written survey that you may receive in the mail after your visit with us. Thank you!             Your Updated Medication List - Protect others around you: Learn how to safely use, store and throw away your medicines at www.disposemymeds.org.          This list is accurate as of: 9/8/17  9:50 AM.  Always use your most recent med list.                   Brand Name Dispense Instructions for use Diagnosis    estradiol 1 MG tablet    ESTRACE    90 tablet    Take 1 tablet (1 mg) by mouth daily    Symptomatic menopausal or female climacteric states       medroxyPROGESTERone 2.5 MG tablet    PROVERA    90 tablet    Take 1 tablet (2.5 mg) by mouth daily    Symptomatic menopausal or female climacteric states       PROBIOTIC PO      Take  by mouth.    Vaginal odor

## 2017-12-22 ENCOUNTER — TELEPHONE (OUTPATIENT)
Dept: FAMILY MEDICINE | Facility: CLINIC | Age: 57
End: 2017-12-22

## 2017-12-22 NOTE — TELEPHONE ENCOUNTER
Provider please advise.  Last office visit here was 7/7/15 with Dr Taylor  Asking for work excuse.  I explained to patient that she should be seen in order to get a work excuse.  Patient did not contact the clinic during the illness  Patient voiced understanding.  Angelina Rae RN

## 2017-12-22 NOTE — TELEPHONE ENCOUNTER
The patient missed work this week 12/18,12/19,12/20 and 12/22 due to illness and is requesting a letter for her employer.  The writer advised the patient that it has been over 2 years since last clinic visit,thus no guarantee that we can honor her request.  The patient was seen elsewhere more recently but declined reaching out to that clinic.  Please follow up with the patient.

## 2018-05-04 ENCOUNTER — HOSPITAL ENCOUNTER (OUTPATIENT)
Dept: MAMMOGRAPHY | Facility: CLINIC | Age: 58
Discharge: HOME | End: 2018-05-04
Attending: ADVANCED PRACTICE MIDWIFE | Admitting: ADVANCED PRACTICE MIDWIFE
Payer: COMMERCIAL

## 2018-05-04 ENCOUNTER — AMBULATORY - HEALTHEAST (OUTPATIENT)
Dept: MULTI SPECIALTY CLINIC | Facility: CLINIC | Age: 58
End: 2018-05-04

## 2018-05-04 DIAGNOSIS — Z12.31 VISIT FOR SCREENING MAMMOGRAM: ICD-10-CM

## 2018-05-04 PROCEDURE — 77063 BREAST TOMOSYNTHESIS BI: CPT

## 2018-05-10 NOTE — PROGRESS NOTES
SUBJECTIVE:   Christine Yadav is a 58 year old female who presents to clinic today for the following health issues:      Gastrointestinal symptoms      Duration: all her life    Description:           Gas coming out all day, constantly and states that it is annoying    Intensity:  0/10    Accompanying signs and symptoms:  none    Other Therapies tried: probiotic, doesn't help      Has had it all her life but bothering her more, bothersome when working out at the gym.  Has tried over the counter medications, unsure of names, has tried peptobismol.  Doesn't matter what she eats or drinks.  Feels like every bite she     No abd pain, sometimes has bloating (worse if eating yogurt).  No nausea or appetite change.  BM every 1-3 days, normal stool consistency, no blood in stool.  Normal colonoscopy .      Has appt with [facility].    Patient Active Problem List   Diagnosis     Cervical high risk human papillomavirus (HPV) DNA test positive     Herpes simplex virus (HSV) infection     Contraceptive management     Other abnormal Papanicolaou smear of cervix and cervical HPV     CARDIOVASCULAR SCREENING; LDL GOAL LESS THAN 160     Menopause syndrome     Past Surgical History:   Procedure Laterality Date     ABDOMINOPLASTY       C NONSPECIFIC PROCEDURE  82,84,91    Vaginal delivery x3     C NONSPECIFIC PROCEDURE           C NONSPECIFIC PROCEDURE  83    SAB     COLONOSCOPY  14    negative, repeat in 10 years     hernia  age 3 or 4    hernia repair     MAMMOPLASTY AUGMENTATION BILATERAL         Social History   Substance Use Topics     Smoking status: Never Smoker     Smokeless tobacco: Never Used     Alcohol use Yes      Comment: 1-2 per month     Family History   Problem Relation Age of Onset     Respiratory Mother      emphysema     Eye Disorder Mother      glaucoma     Psychotic Disorder Mother      depression     Depression Mother      CANCER Mother      Hypertension Father      CANCER Father       "pancreatic CA-  72     Other - See Comments Sister      fibromyalgia     DIABETES Maternal Grandfather      GASTROINTESTINAL DISEASE Daughter      ulcers     Breast Cancer Maternal Aunt      Unsure what age or outcome     Alcohol/Drug Brother      Alcohol     Liver Disease Brother      Depression Daughter      Other - See Comments Other      similar to MS         Current Outpatient Prescriptions   Medication Sig Dispense Refill     estradiol (ESTRACE) 1 MG tablet Take 1 tablet (1 mg) by mouth daily 90 tablet 3     medroxyPROGESTERone (PROVERA) 2.5 MG tablet Take 1 tablet (2.5 mg) by mouth daily 90 tablet 3     Probiotic Product (PROBIOTIC PO) Take  by mouth.       simethicone (MYLICON) 125 MG CHEW chewable tablet Take 1 tablet (125 mg) by mouth 3 times daily (with meals) 90 tablet 1       ROS:  Const, GI as above, otherwise negative       OBJECTIVE:                                                    /75 (BP Location: Right arm, Patient Position: Chair, Cuff Size: Adult Regular)  Pulse 63  Temp 98.6  F (37  C) (Temporal)  Resp 16  Ht 4' 10\" (1.473 m)  Wt 115 lb (52.2 kg)  LMP 2011  SpO2 100%  BMI 24.04 kg/m2   GENERAL APPEARANCE: healthy, alert and no distress  EYES: Eyes grossly normal to inspection and conjunctivae and sclerae normal  RESP: respirations nonlabored  PSYCH: mentation appears normal and affect normal/bright        ASSESSMENT/PLAN:                                                    (R14.3,  R14.1,  R14.2) Flatulence, eructation and gas pain  (primary encounter diagnosis)  Comment: no symptoms of underlying GI illness or malabsorption syndrome  Plan: simethicone (MYLICON) 125 MG CHEW chewable         tablet, NUTRITION REFERRAL        discussed trial of simethicone but limited evidence for efficacy.  Discussed pepto bismol can help reduce odor.  Discussed limiting has producing foods and consider trial of low FODMAP diet, referral to nutritionist.  She will follow up with kenna FINNEY " consider treatment of SBO with rifaximin?          See Patient Instructions    Cat Crabtree, [facility]    Patient Instructions   To help limit gas I recommend limiting starchy fruits and vegetables (brussle sprouts, broccoli, potatoes), legumes, carbonated beverages, cabbage, onion, and wheat.  Limiting sugary/processed foods and dairy can also help.  Consider follow up with nutritionist to discuss low FODMAP diet     Try simethicone 1 tablet three times a day as needed for gas.  pepto bismol can help limit odor    Follow up with GI as scheduled, thy might recommend antibiotic treatment for possible small bowel overgrowth.

## 2018-05-11 ENCOUNTER — OFFICE VISIT (OUTPATIENT)
Dept: FAMILY MEDICINE | Facility: CLINIC | Age: 58
End: 2018-05-11
Payer: COMMERCIAL

## 2018-05-11 VITALS
DIASTOLIC BLOOD PRESSURE: 75 MMHG | RESPIRATION RATE: 16 BRPM | OXYGEN SATURATION: 100 % | HEART RATE: 63 BPM | BODY MASS INDEX: 24.14 KG/M2 | SYSTOLIC BLOOD PRESSURE: 133 MMHG | WEIGHT: 115 LBS | HEIGHT: 58 IN | TEMPERATURE: 98.6 F

## 2018-05-11 DIAGNOSIS — R14.3 FLATULENCE, ERUCTATION AND GAS PAIN: Primary | ICD-10-CM

## 2018-05-11 DIAGNOSIS — R14.1 FLATULENCE, ERUCTATION AND GAS PAIN: Primary | ICD-10-CM

## 2018-05-11 DIAGNOSIS — R14.2 FLATULENCE, ERUCTATION AND GAS PAIN: Primary | ICD-10-CM

## 2018-05-11 PROCEDURE — 99213 OFFICE O/P EST LOW 20 MIN: CPT | Performed by: NURSE PRACTITIONER

## 2018-05-11 RX ORDER — SIMETHICONE 125 MG
125 TABLET,CHEWABLE ORAL
Qty: 90 TABLET | Refills: 1 | Status: SHIPPED | OUTPATIENT
Start: 2018-05-11 | End: 2020-07-10

## 2018-05-11 NOTE — PATIENT INSTRUCTIONS
To help limit gas I recommend limiting starchy fruits and vegetables (brussle sprouts, broccoli, potatoes), legumes, carbonated beverages, cabbage, onion, and wheat.  Limiting sugary/processed foods and dairy can also help.  Consider follow up with nutritionist to discuss low FODMAP diet     Try simethicone 1 tablet three times a day as needed for gas.  pepto bismol can help limit odor    Follow up with GI as scheduled, thy might recommend antibiotic treatment for possible small bowel overgrowth.

## 2018-05-11 NOTE — MR AVS SNAPSHOT
After Visit Summary   5/11/2018    Christine Yadav    MRN: [id number]           Patient Information     Date Of Birth          1960        Visit Information        Provider Department      5/11/2018 10:20 AM Cat Crabtree APRN CNP [facility]        Today's Diagnoses     Flatulence, eructation and gas pain    -  1      Care Instructions    To help limit gas I recommend limiting starchy fruits and vegetables (brussle sprouts, broccoli, potatoes), legumes, carbonated beverages, cabbage, onion, and wheat.  Limiting sugary/processed foods and dairy can also help.  Consider follow up with nutritionist to discuss low FODMAP diet     Try simethicone 1 tablet three times a day as needed for gas.  pepto bismol can help limit odor    Follow up with GI as scheduled, thy might recommend antibiotic treatment for possible small bowel overgrowth.            Follow-ups after your visit        Additional Services     NUTRITION REFERRAL       Your provider has referred you to: [facility]: [facility] [phone]   [email or web address]  [facility]: [place] [facility] [phone]   [email or web address]  [facility]: [facility] [phone]   [email or web address]    Please be aware that coverage of these services is subject to the terms and limitations of your health insurance plan.  Call member services at your health plan with any benefit or coverage questions.      Please bring the following with you to your appointment:    (1) This referral request  (2) Any documents given to you regarding this referral  (3) Any specific questions you have about diet and/or food choices                  Who to contact     If you have questions or need follow up information about today's clinic visit or your schedule please contact [facility] directly at [phone].  Normal  "or non-critical lab and imaging results will be communicated to you by MyChart, letter or phone within 4 business days after the clinic has received the results. If you do not hear from us within 7 days, please contact the clinic through [employer] or phone. If you have a critical or abnormal lab result, we will notify you by phone as soon as possible.  Submit refill requests through [employer] or call your pharmacy and they will forward the refill request to us. Please allow 3 business days for your refill to be completed.          Additional Information About Your Visit        [employer] Information     [employer] lets you send messages to your doctor, view your test results, renew your prescriptions, schedule appointments and more. To sign up, go to [email or web address] . Click on \"Log in\" on the left side of the screen, which will take you to the Welcome page. Then click on \"Sign up Now\" on the right side of the page.     You will be asked to enter the access code listed below, as well as some personal information. Please follow the directions to create your username and password.     Your access code is: [email or web address]  Expires: 2018 10:52 AM     Your access code will  in 90 days. If you need help or a new code, please call your [place] clinic or [phone].        Care EveryWhere ID     This is your Care EveryWhere ID. This could be used by other organizations to access your [place] medical records  [id number]        Your Vitals Were     Pulse Temperature Respirations Height Last Period Pulse Oximetry    63 98.6  F (37  C) (Temporal) 16 4' 10\" (1.473 m) 2011 100%    BMI (Body Mass Index)                   24.04 kg/m2            Blood Pressure from Last 3 Encounters:   18 133/75   17 90/64   17 117/80    Weight from Last 3 Encounters:   18 115 lb (52.2 kg)   17 120 lb (54.4 kg)   17 122 lb (55.3 kg)              We Performed the Following     NUTRITION REFERRAL     "      Today's Medication Changes          These changes are accurate as of 5/11/18 10:52 AM.  If you have any questions, ask your nurse or doctor.               Start taking these medicines.        Dose/Directions    simethicone 125 MG Chew chewable tablet   Commonly known as:  MYLICON   Used for:  Flatulence, eructation and gas pain   Started by:  Cat Crabtree APRN CNP        Dose:  125 mg   Take 1 tablet (125 mg) by mouth 3 times daily (with meals)   Quantity:  90 tablet   Refills:  1            Where to get your medicines      These medications were sent to [facility] [place], [place] - [address]  [address], [facility] [address]     Phone:  [phone]     simethicone 125 MG Chew chewable tablet                Primary Care Provider Office Phone # Fax #    Nella Taylor [phone]       [address]  [facility] [address]        Equal Access to Services     YAN GIRON AH: Hadii jose lindsay hadasho Soomaali, waaxda luqadaha, qaybta kaalmada adeegyada, jonny morel . So [facility] [phone].    ATENCIÓN: Si habla español, tiene a schneider disposición servicios gratuitos de asistencia lingüística. Llame al [phone].    We comply with applicable federal civil rights laws and [place] laws. We do not discriminate on the basis of race, color, national origin, age, disability, sex, sexual orientation, or gender identity.            Thank you!     Thank you for choosing [facility]  for your care. Our goal is always to provide you with excellent care. Hearing back from our patients is one way we can continue to improve our services. Please take a few minutes to complete the written survey that you may receive in the mail after your visit with us. Thank you!             Your Updated Medication List - Protect others around you: Learn how to safely use, store and throw away your medicines at www.disposemymeds.org.          This  list is accurate as of 5/11/18 10:52 AM.  Always use your most recent med list.                   Brand Name Dispense Instructions for use Diagnosis    estradiol 1 MG tablet    ESTRACE    90 tablet    Take 1 tablet (1 mg) by mouth daily    Symptomatic menopausal or female climacteric states       medroxyPROGESTERone 2.5 MG tablet    PROVERA    90 tablet    Take 1 tablet (2.5 mg) by mouth daily    Symptomatic menopausal or female climacteric states       PROBIOTIC PO      Take  by mouth.    Vaginal odor       simethicone 125 MG Chew chewable tablet    MYLICON    90 tablet    Take 1 tablet (125 mg) by mouth 3 times daily (with meals)    Flatulence, eructation and gas pain

## 2018-06-22 ENCOUNTER — RESULT FOLLOW UP (OUTPATIENT)
Dept: OBGYN | Facility: CLINIC | Age: 58
End: 2018-06-22

## 2018-06-22 ENCOUNTER — OFFICE VISIT (OUTPATIENT)
Dept: OBGYN | Facility: CLINIC | Age: 58
End: 2018-06-22
Payer: COMMERCIAL

## 2018-06-22 VITALS
HEART RATE: 74 BPM | SYSTOLIC BLOOD PRESSURE: 116 MMHG | HEIGHT: 60 IN | BODY MASS INDEX: 22.19 KG/M2 | TEMPERATURE: 98.2 F | DIASTOLIC BLOOD PRESSURE: 75 MMHG | WEIGHT: 113 LBS

## 2018-06-22 DIAGNOSIS — N95.1 SYMPTOMATIC MENOPAUSAL OR FEMALE CLIMACTERIC STATES: ICD-10-CM

## 2018-06-22 DIAGNOSIS — Z11.3 ROUTINE SCREENING FOR STI (SEXUALLY TRANSMITTED INFECTION): ICD-10-CM

## 2018-06-22 DIAGNOSIS — Z12.4 SCREENING FOR MALIGNANT NEOPLASM OF CERVIX: ICD-10-CM

## 2018-06-22 DIAGNOSIS — Z01.419 ENCOUNTER FOR GYNECOLOGICAL EXAMINATION WITHOUT ABNORMAL FINDING: Primary | ICD-10-CM

## 2018-06-22 DIAGNOSIS — R87.810 CERVICAL HIGH RISK HUMAN PAPILLOMAVIRUS (HPV) DNA TEST POSITIVE: ICD-10-CM

## 2018-06-22 PROCEDURE — G0123 SCREEN CERV/VAG THIN LAYER: HCPCS | Performed by: OBSTETRICS & GYNECOLOGY

## 2018-06-22 PROCEDURE — 86780 TREPONEMA PALLIDUM: CPT | Performed by: OBSTETRICS & GYNECOLOGY

## 2018-06-22 PROCEDURE — 99396 PREV VISIT EST AGE 40-64: CPT | Performed by: OBSTETRICS & GYNECOLOGY

## 2018-06-22 PROCEDURE — 87591 N.GONORRHOEAE DNA AMP PROB: CPT | Performed by: OBSTETRICS & GYNECOLOGY

## 2018-06-22 PROCEDURE — 86803 HEPATITIS C AB TEST: CPT | Performed by: OBSTETRICS & GYNECOLOGY

## 2018-06-22 PROCEDURE — 87491 CHLMYD TRACH DNA AMP PROBE: CPT | Performed by: OBSTETRICS & GYNECOLOGY

## 2018-06-22 PROCEDURE — 87389 HIV-1 AG W/HIV-1&-2 AB AG IA: CPT | Performed by: OBSTETRICS & GYNECOLOGY

## 2018-06-22 PROCEDURE — 36415 COLL VENOUS BLD VENIPUNCTURE: CPT | Performed by: OBSTETRICS & GYNECOLOGY

## 2018-06-22 PROCEDURE — 87624 HPV HI-RISK TYP POOLED RSLT: CPT | Performed by: OBSTETRICS & GYNECOLOGY

## 2018-06-22 RX ORDER — ESTRADIOL 1 MG/1
1 TABLET ORAL DAILY
Qty: 90 TABLET | Refills: 3 | Status: SHIPPED | OUTPATIENT
Start: 2018-06-22 | End: 2019-06-28

## 2018-06-22 RX ORDER — MEDROXYPROGESTERONE ACETATE 2.5 MG/1
2.5 TABLET ORAL DAILY
Qty: 90 TABLET | Refills: 3 | Status: SHIPPED | OUTPATIENT
Start: 2018-06-22 | End: 2019-06-28

## 2018-06-22 NOTE — NURSING NOTE
Chief Complaint   Patient presents with     Physical       Initial /75  Pulse 74  Temp 98.2  F (36.8  C) (Oral)  Ht 5' (1.524 m)  Wt 113 lb (51.3 kg)  LMP 2011  BMI 22.07 kg/m2 Estimated body mass index is 22.07 kg/(m^2) as calculated from the following:    Height as of this encounter: 5' (1.524 m).    Weight as of this encounter: 113 lb (51.3 kg).  BP completed using cuff size: regular        The following HM Due: pap smear      The following patient reported/Care Every where data was sent to:  P ABSTRACT QUALITY INITIATIVES [[id number]]  na      patient has appointment for today

## 2018-06-22 NOTE — LETTER
July 8, 2019      Christine Yadav  [address] [place] [address]    Dear ,      This letter is regarding the PAP smear and HPV (Human Papillomavirus) test you had done recently. Your PAP test result is normal, but your HPV (Human Papillomavirus) test was positive.     Since HPV can be responsible for changing the cells on your cervix, and those changes may not be detected on a pap smear, we recommend a follow up procedure called a colposcopy (which Dr. Quesada has already discussed with you) to take a closer look at your cervix.     This  is a procedure that gives your healthcare provider a magnified view of your cervix. It is done using a lighted microscope called a colposcope. In most cases, a sample of cervical cells is taken during a biopsy. The sample is then brought to the lab for study.    Here are some instructions for your colposcopy:    - If you can take ibuprofen, we recommend you take 400mg Ibuprofen 1 hour before procedure  - Nothing vaginally 24 hours before the colposcopy (no sex, no vaginal medications/creams/gels, no douching)  - No unprotected sex for 2 weeks prior to the colposcopy (for risk of a pregnancy that may not be detected on a pap smear)  - Try to schedule the colposcopy when you are not expecting a period    About 80 percent of women have been exposed to HPV virus throughout their lifetime. There is no medication for the treatment of HPV. Typically your own immune system gets rid of the virus before it does harm. HPV is spread by direct skin-to-skin contact, including sexual intercourse, oral sex, anal sex, or any other contact involving the genital area (example: hand to genital contact). It is not possible to become infected with HPV by touching an object, such as a toilet seat. Most people who are infected with HPV have no signs or symptoms.    Things that you can do to boost your immune system and help your body get rid of HPV: get plenty of rest, eat a well-balanced diet of  healthy foods, stop smoking, exercise regularly and decrease stress.  If you have additional questions regarding this result, please call our registered nurse, Fatimah at [phone].    Sincerely,    ALBERT Hunt, RN - Pap Tracking  (with Zehra Crowell MD)

## 2018-06-22 NOTE — MR AVS SNAPSHOT
"              After Visit Summary   2018    Christine Yadav    MRN: [id number]           Patient Information     Date Of Birth          1960        Visit Information        Provider Department      2018 12:30 PM Zehra Crowell MD [facility]        Today's Diagnoses     Encounter for gynecological examination without abnormal finding    -  1    Screening for malignant neoplasm of cervix        Routine screening for STI (sexually transmitted infection)           Follow-ups after your visit        Who to contact     If you have questions or need follow up information about today's clinic visit or your schedule please contact [facility] directly at [phone].  Normal or non-critical lab and imaging results will be communicated to you by MyChart, letter or phone within 4 business days after the clinic has received the results. If you do not hear from us within 7 days, please contact the clinic through MyChart or phone. If you have a critical or abnormal lab result, we will notify you by phone as soon as possible.  Submit refill requests through [employer] or call your pharmacy and they will forward the refill request to us. Please allow 3 business days for your refill to be completed.          Additional Information About Your Visit        MyChart Information     [employer] lets you send messages to your doctor, view your test results, renew your prescriptions, schedule appointments and more. To sign up, go to [email or web address] . Click on \"Log in\" on the left side of the screen, which will take you to the Welcome page. Then click on \"Sign up Now\" on the right side of the page.     You will be asked to enter the access code listed below, as well as some personal information. Please follow the directions to create your username and password.     Your access code is: [email or web address]  Expires: 2018 10:52 AM     Your access code will  in 90 days. If you need " help or a new code, please call your [place] clinic or [phone].        Care EveryWhere ID     This is your Care EveryWhere ID. This could be used by other organizations to access your [place] medical records  [id number]        Your Vitals Were     Pulse Temperature Height Last Period BMI (Body Mass Index)       74 98.2  F (36.8  C) (Oral) 5' (1.524 m) 12/01/2011 22.07 kg/m2        Blood Pressure from Last 3 Encounters:   06/22/18 116/75   05/11/18 133/75   08/25/17 90/64    Weight from Last 3 Encounters:   06/22/18 113 lb (51.3 kg)   05/11/18 115 lb (52.2 kg)   09/08/17 120 lb (54.4 kg)              We Performed the Following     CHLAMYDIA TRACHOMATIS PCR     Hepatitis C antibody     HIV Antigen Antibody Combo     HPV High Risk Types DNA Cervical     NEISSERIA GONORRHOEA PCR     Pap imaged thin layer screen with HPV - recommended age 30 - 65 years (select HPV order below)     Treponema Abs w Reflex to RPR and Titer        Primary Care Provider Office Phone # Fax #    Nella Taylor [phone]       [address]  [facility] [address]        Equal Access to Services     LUISANA GIRON AH: Hadii jsoe siddiqio Soalejandra, waaxda luaugustusadaha, qaybta kaalmada adebrandyyada, jonny nash. So [facility] [phone].    ATENCIÓN: Si habla español, tiene a schneider disposición servicios gratuitos de asistencia lingüística. Llame al [phone].    We comply with applicable federal civil rights laws and [place] laws. We do not discriminate on the basis of race, color, national origin, age, disability, sex, sexual orientation, or gender identity.            Thank you!     Thank you for choosing [facility]  [place] your [place]. Our goal is always to provide you with excellent care. Hearing back from our patients is one way we can continue to improve our services. Please take a few minutes to complete the written survey that you may receive in the mail after your visit with us.  Thank you!             Your Updated Medication List - Protect others around you: Learn how to safely use, store and throw away your medicines at www.disposemymeds.org.          This list is accurate as of 6/22/18 12:52 PM.  Always use your most recent med list.                   Brand Name Dispense Instructions for use Diagnosis    estradiol 1 MG tablet    ESTRACE    90 tablet    Take 1 tablet (1 mg) by mouth daily    Symptomatic menopausal or female climacteric states       medroxyPROGESTERone 2.5 MG tablet    PROVERA    90 tablet    Take 1 tablet (2.5 mg) by mouth daily    Symptomatic menopausal or female climacteric states       PROBIOTIC PO      Take  by mouth.    Vaginal odor       simethicone 125 MG Chew chewable tablet    MYLICON    90 tablet    Take 1 tablet (125 mg) by mouth 3 times daily (with meals)    Flatulence, eructation and gas pain

## 2018-06-22 NOTE — LETTER
June 29, 2018      Christine Yadav  [address] [place] [address]    Dear ,      This letter is in regards to the PAP smear and HPV (Human Papillomavirus) test you had done recently. Your PAP test result is normal, but your HPV (Human Papillomavirus) test was positive.     About 80 percent of women have been exposed to HPV virus throughout their lifetime. There is no medication for the treatment of HPV. Typically your own immune system gets rid of the virus before it does harm. HPV is spread by direct skin-to-skin contact, including sexual intercourse, oral sex, anal sex, or any other contact involving the genital area (example: hand to genital contact). It is not possible to become infected with HPV by touching an object, such as a toilet seat. Most people who are infected with HPV have no signs or symptoms.    Things that you can do to boost your immune system and help your body get rid of HPV: get plenty of rest, eat a well-balanced diet of healthy foods, and stop smoking.     Please return in 1 year to repeat your pap smear and HPV test.     If you have additional questions regarding this result, please call [phone].    Sincerely,      Zehra Crowlel MD/[facility]

## 2018-06-22 NOTE — PROGRESS NOTES
GYN CLINIC VISIT  2018  CC: annual exam    HPI: Christine is a 58 year old  postmenopausal female who presents for annual exam.   Postmenopausal since 52.  She is having hot flashes, mild. No vaginal bleeding noted. Using HRT, desires refill.   Wants STI screening.    Besides routine health maintenance, she has no other health concerns today .  GYNECOLOGIC HISTORY:  Menarche: 13   Age at first intercourse: 20 Number of lifetime partners: 6  She is not sexually active with male partner(s) and she is currently in monogamous relationship with .    History sexually transmitted infections:Chlamydia, Gonorrhea, Herpes and Bacterial vaginosis  STI testing offered?  Accepted  Estrogen replacement therapy: Yes -  Oral  ADILENE exposure: Unknown  History of abnormal Pap smear: NO - age 30- 65 PAP every 3 years recommended  Family history of breast CA: Yes (Please explain): Maternal Aunt  Family history of uterine/ovarian CA: No  Family history of colon CA: No    HEALTH MAINTENANCE:  Cholesterol: (No components found for: CHOL2 ) History of abnormal lipids: No  Mammo: 20 . History of abnormal Mammo: No  Regular Self Breast Exams: No  Colonoscopy: 2012 History of abnormal Colonoscopy: No  Dexa: 07/28/15 History of abnormal Dexa: No  Calcium/Vitamin D intake: source:  dairy, veggies Adequate? Yes  TSH: (No components found for: TSH1 )  Pap; (  Lab Results   Component Value Date    PAP NIL 2015    PAP NIL 2012    PAP NIL 2011    )    HISTORY:  Obstetric History       T4      L4     SAB1   TAB0   Ectopic0   Multiple0   Live Births4       # Outcome Date GA Lbr Juan Pablo/2nd Weight Sex Delivery Anes PTL Lv   5 Term 07/15/91 40w0d       MEENA   4 Term 08/10/90 40w0d    CS   MEENA   3 Term 84 40w0d       MEENA   2 Term 10/02/82 40w0d       MEENA   1 SAB 80     SAB   DEC        Past Medical History:   Diagnosis Date     ASCUS on Pap smear     + HPV , colp WNL      Cervical high risk human papillomavirus (HPV) DNA test positive     Unable to type     Herpes simplex without mention of complication     no recent outbreaks     Other and unspecified hyperlipidemia     elevated chol, ratio WNL     Papanicolaou smear of cervix with atypical squamous cells of undetermined significance (ASC-US)     +Endometrial cells  and 2002 US neg     Varicella without mention of complication     Chickenpox     Past Surgical History:   Procedure Laterality Date     ABDOMINOPLASTY       C NONSPECIFIC PROCEDURE  82,84,91    Vaginal delivery x3     C NONSPECIFIC PROCEDURE           C NONSPECIFIC PROCEDURE  83    SAB     COLONOSCOPY  14    negative, repeat in 10 years     hernia  age 3 or 4    hernia repair     MAMMOPLASTY AUGMENTATION BILATERAL       Family History   Problem Relation Age of Onset     Respiratory Mother      emphysema     Eye Disorder Mother      glaucoma     Psychotic Disorder Mother      depression     Depression Mother      Cancer Mother      Hypertension Father      Cancer Father      pancreatic CA-  72     Other - See Comments Sister      fibromyalgia     Diabetes Maternal Grandfather      GASTROINTESTINAL DISEASE Daughter      ulcers     Breast Cancer Maternal Aunt      Unsure what age or outcome     Alcohol/Drug Brother      Alcohol     Liver Disease Brother      Depression Daughter      Other - See Comments Other      similar to MS     Social History   Lives with  and 1 daughter, feels safe.  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  Social History     Marital status:      Spouse name: N/A     Number of children: 4     Years of education: 12     Social History Main Topics     Smoking status: Never Smoker     Smokeless tobacco: Never Used     Alcohol use Yes      Comment: 1-2 per month     Drug use: No     Sexual activity: Yes     Partners: Male     Birth control/ protection: Surgical, Post-menopausal      Comment:  vasectomy     Other Topics Concern      Service No     Blood Transfusions No     Caffeine Concern No     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 No     Social History Narrative    Caffeine intake/servings daily - 0-1 cup of coffee    Calcium intake/servings daily - 0    Exercise 0 times weekly - describe weights, going to start again    Sunscreen used - No    Seatbelts used - Yes    Guns stored in the home - No    Self Breast Exam - No    Pap test up to date -  Yes, as of today    Eye exam up to date -  No    Dental exam up to date -  Yes    DEXA scan up to date -  Not Applicable    Flex Sig/Colonoscopy up to date -  Not Applicable    Mammography up to date -  No    Immunizations reviewed and up to date - Unsure    Abuse: Current or Past (Physical, Sexual or Emotional) - No    Do you feel safe in your environment - Yes    Do you cope well with stress - Yes    Do you suffer from insomnia - No    Last updated by: Natali Osborne MA 9/16/11                           Current Outpatient Prescriptions:      estradiol (ESTRACE) 1 MG tablet, Take 1 tablet (1 mg) by mouth daily, Disp: 90 tablet, Rfl: 3     medroxyPROGESTERone (PROVERA) 2.5 MG tablet, Take 1 tablet (2.5 mg) by mouth daily, Disp: 90 tablet, Rfl: 3     Probiotic Product (PROBIOTIC PO), Take  by mouth., Disp: , Rfl:      simethicone (MYLICON) 125 MG CHEW chewable tablet, Take 1 tablet (125 mg) by mouth 3 times daily (with meals) (Patient not taking: Reported on 6/22/2018), Disp: 90 tablet, Rfl: 1     [DISCONTINUED] estradiol (ESTRACE) 1 MG tablet, Take 1 tablet (1 mg) by mouth daily, Disp: 90 tablet, Rfl: 3     Allergies   Allergen Reactions     No Known  Drug Allergies        Past medical, surgical, social and family history were reviewed and updated in EPIC.    ROS:   C:       NEGATIVE for fever, chills, change in weight  I:         NEGATIVE for worrisome rashes, moles or lesions  E:       NEGATIVE for vision changes or irritation  E/M:   NEGATIVE for ear, mouth and throat problems  R:       NEGATIVE for significant cough or SOB  CV:     NEGATIVE for chest pain, palpitations or peripheral edema  GI:      NEGATIVE for nausea, abdominal pain, heartburn, or change in bowel habits  :    NEGATIVE for frequency, dysuria, hematuria, vaginal discharge, or bleeding  M:       NEGATIVE for significant arthralgias or myalgia  N:       NEGATIVE for weakness, dizziness or paresthesias  E:       NEGATIVE for temperature intolerance, skin/hair changes  P:       NEGATIVE for changes in mood or affect    EXAM:  /75  Pulse 74  Temp 98.2  F (36.8  C) (Oral)  Ht 5' (1.524 m)  Wt 113 lb (51.3 kg)  LMP 12/01/2011  BMI 22.07 kg/m2   BMI: Body mass index is 22.07 kg/(m^2).  Constitutional: healthy, alert and no distress  Head: Normocephalic. No masses, lesions, tenderness or abnormalities  Neck: Neck supple. Trachea midline. No adenopathy. Thyroid symmetric, normal size.   Cardiovascular: RRR. Normal S1 and S2  Respiratory: clear bilaterally.   Breast: s/p augmentation .No nodularity, asymmetry or nipple discharge bilaterally.  Gastrointestinal: Abdomen soft, non-tender, non-distended. No masses, organomegaly  :  Vulva:  No external lesions, normal female hair distribution, no inguinal adenopathy.    Urethra:  Midline, non-tender, well supported, no discharge  Vagina:  Atrophic, no abnormal discharge, no lesions  Uterus:  Normal size, anteverted , non-tender, freely mobile  Ovaries:  No masses appreciated, non-tender, mobile  Rectal Exam: deferred  Musculoskeletal: extremities normal  Skin: no suspicious lesions or rashes  Psychiatric: Affect appropriate,  cooperative,mentation appears normal.     COUNSELING:   Reviewed preventive health counseling, as reflected in patient instructions       Regular exercise       Healthy diet/nutrition       Osteoporosis Prevention/Bone Health   reports that she has never smoked. She has never used smokeless tobacco.    Body mass index is 22.07 kg/(m^2).      FRAX Risk Assessment  ASSESSMENT:  58 year old  with satisfactory annual exam  1. Encounter for gynecological examination without abnormal finding      2. Screening for malignant neoplasm of cervix    - Pap imaged thin layer screen with HPV - recommended age 30 - 65 years (select HPV order below)  - HPV High Risk Types DNA Cervical    3. Routine screening for STI (sexually transmitted infection)    - NEISSERIA GONORRHOEA PCR  - CHLAMYDIA TRACHOMATIS PCR  - HIV Antigen Antibody Combo  - Hepatitis C antibody  - Treponema Abs w Reflex to RPR and Titer    4. Symptomatic menopausal or female climacteric states  Discussed risks of HRT including thromboembolic disease and breast cancer. Discussed importance of progesterone component to protect uterine lining. Discussed recommendation of using lowest dose for shortest time needed to relieve symptoms. Plan to re-evaluate yearly.     - estradiol (ESTRACE) 1 MG tablet; Take 1 tablet (1 mg) by mouth daily  Dispense: 90 tablet; Refill: 3  - medroxyPROGESTERone (PROVERA) 2.5 MG tablet; Take 1 tablet (2.5 mg) by mouth daily  Dispense: 90 tablet; Refill: 3

## 2018-06-23 LAB — T PALLIDUM AB SER QL: NONREACTIVE

## 2018-06-24 LAB
C TRACH DNA SPEC QL NAA+PROBE: NEGATIVE
N GONORRHOEA DNA SPEC QL NAA+PROBE: NEGATIVE
SPECIMEN SOURCE: NORMAL
SPECIMEN SOURCE: NORMAL

## 2018-06-25 LAB
HCV AB SERPL QL IA: NONREACTIVE
HIV 1+2 AB+HIV1 P24 AG SERPL QL IA: NONREACTIVE

## 2018-06-27 LAB
COPATH REPORT: NORMAL
PAP: NORMAL

## 2018-06-28 LAB
FINAL DIAGNOSIS: ABNORMAL
HPV HR 12 DNA CVX QL NAA+PROBE: POSITIVE
HPV16 DNA SPEC QL NAA+PROBE: NEGATIVE
HPV18 DNA SPEC QL NAA+PROBE: NEGATIVE
SPECIMEN DESCRIPTION: ABNORMAL
SPECIMEN SOURCE CVX/VAG CYTO: ABNORMAL

## 2018-06-29 NOTE — PROGRESS NOTES
7/05- ASCUS with HPV  6/06- Endometrial cells assoc with menses  7/07- ASCUS with HPV-8/07-colp WNL  NIL paps: 1/08, 9/08, 10/09, 10/10, 9/11, 9/12. Plan pap in 3 yrs.  07/09/15: NIL pap, Neg HR HPV result.   06/22/18: NIL pap, + HR HPV (not 16 or 18) result. Plan cotest in 1 year. Pt was advised.   06/29/18 Result letter sent at request of RN. ([facility])  6/28/19 NIL pap, + HR HPV (not 16 or 18). Plan: colpo ([place])  07/05/19 Attempted to reach pt by phone - busy signal. Unable to LM ([place])  7/6/19 Pt spoke with provider regarding results and recommended colp (see 7/2/19 tele enc) ([place])  07/08/19 Result letter sent at request of RN. ([facility])   7/19/19 Otto scheduled ([place])  07/19/19: Otto ECC very scant benign squamous epithelium without atypia. Plan cotest in 1 year. Provider's office informed the pt pf the results and recommendations. (sk)

## 2018-07-02 ENCOUNTER — TELEPHONE (OUTPATIENT)
Dept: OBGYN | Facility: CLINIC | Age: 58
End: 2018-07-02

## 2018-07-02 NOTE — TELEPHONE ENCOUNTER
The pt called and had questions about her recent HPV test being positive. I answered her questions. She will plan on rechecking this in 1 year. Fatimah Levine RN-Pap Tracking

## 2019-06-28 ENCOUNTER — OFFICE VISIT (OUTPATIENT)
Dept: OBGYN | Facility: CLINIC | Age: 59
End: 2019-06-28
Payer: COMMERCIAL

## 2019-06-28 ENCOUNTER — AMBULATORY - HEALTHEAST (OUTPATIENT)
Dept: MULTI SPECIALTY CLINIC | Facility: CLINIC | Age: 59
End: 2019-06-28

## 2019-06-28 VITALS
SYSTOLIC BLOOD PRESSURE: 113 MMHG | BODY MASS INDEX: 22.26 KG/M2 | WEIGHT: 114 LBS | DIASTOLIC BLOOD PRESSURE: 76 MMHG | TEMPERATURE: 98.1 F | HEART RATE: 71 BPM

## 2019-06-28 DIAGNOSIS — R87.810 CERVICAL HIGH RISK HUMAN PAPILLOMAVIRUS (HPV) DNA TEST POSITIVE: ICD-10-CM

## 2019-06-28 DIAGNOSIS — N95.1 SYMPTOMATIC MENOPAUSAL OR FEMALE CLIMACTERIC STATES: ICD-10-CM

## 2019-06-28 DIAGNOSIS — Z01.419 ENCOUNTER FOR GYNECOLOGICAL EXAMINATION WITHOUT ABNORMAL FINDING: Primary | ICD-10-CM

## 2019-06-28 LAB
HIV 1+2 AB+HIV1 P24 AG SERPL QL IA: NONREACTIVE
HPV_EXT - HISTORICAL: ABNORMAL
PAP SMEAR - HIM PATIENT REPORTED: NORMAL

## 2019-06-28 PROCEDURE — 88175 CYTOPATH C/V AUTO FLUID REDO: CPT | Performed by: OBSTETRICS & GYNECOLOGY

## 2019-06-28 PROCEDURE — 87591 N.GONORRHOEAE DNA AMP PROB: CPT | Performed by: OBSTETRICS & GYNECOLOGY

## 2019-06-28 PROCEDURE — 99396 PREV VISIT EST AGE 40-64: CPT | Performed by: OBSTETRICS & GYNECOLOGY

## 2019-06-28 PROCEDURE — 36415 COLL VENOUS BLD VENIPUNCTURE: CPT | Performed by: OBSTETRICS & GYNECOLOGY

## 2019-06-28 PROCEDURE — 87389 HIV-1 AG W/HIV-1&-2 AB AG IA: CPT | Performed by: OBSTETRICS & GYNECOLOGY

## 2019-06-28 PROCEDURE — 87624 HPV HI-RISK TYP POOLED RSLT: CPT | Performed by: OBSTETRICS & GYNECOLOGY

## 2019-06-28 PROCEDURE — 87491 CHLMYD TRACH DNA AMP PROBE: CPT | Performed by: OBSTETRICS & GYNECOLOGY

## 2019-06-28 RX ORDER — MEDROXYPROGESTERONE ACETATE 2.5 MG/1
2.5 TABLET ORAL DAILY
Qty: 90 TABLET | Refills: 3 | Status: SHIPPED | OUTPATIENT
Start: 2019-06-28 | End: 2020-07-10

## 2019-06-28 RX ORDER — ESTRADIOL 1 MG/1
1 TABLET ORAL DAILY
Qty: 90 TABLET | Refills: 3 | Status: SHIPPED | OUTPATIENT
Start: 2019-06-28 | End: 2020-07-10

## 2019-06-28 NOTE — PROGRESS NOTES
GYN CLINIC VISIT  2019  CC: annual exam    HPI:  Christine is a 59 year old  who presents for annual exam.   Postmenopausal since 52.  She is having hot flashes, mild. No vaginal bleeding noted. Taking HRT, it is working well for her.    Besides routine health maintenance,  she would like to discuss STD Check.  GYNECOLOGIC HISTORY:  Menarche: 13   Age at first intercourse: 13 Number of lifetime partners: 6  She is not sexually active with 0male partner(s) and she is currently in monogamous relationship.    History sexually transmitted infections:Chlamydia, Gonorrhea and Herpes  STI testing offered?  Accepted  Estrogen replacement therapy: Yes -  Oral  ADILENE exposure: Unknown    History of abnormal Pap smear: NO - age 30- 65 PAP every 3 years recommended  Family history of breast CA: Yes (Please explain): M-Aunt  Family history of uterine/ovarian CA: No  Family history of colon CA: No    HEALTH MAINTENANCE:  Cholesterol: (No components found for: CHOL2 ) History of abnormal lipids: No  Mammo: 2018 . History of abnormal Mammo: No  Regular Self Breast Exams: No  Colonoscopy:12  History of abnormal Colonoscopy: No  Dexa: 2013 History of abnormal Dexa: No  Calcium/Vitamin D intake: source:  dairy, dietary supplement(s) Adequate? Yes  TSH: (No components found for: TSH1 )  Pap; (  Lab Results   Component Value Date    PAP NIL 2018    PAP NIL 2015    PAP NIL 2012    )    HISTORY:  OB History    Para Term  AB Living   5 4 4 0 1 4   SAB TAB Ectopic Multiple Live Births   1 0 0 0 4      # Outcome Date GA Lbr Juan Pablo/2nd Weight Sex Delivery Anes PTL Lv   5 Term 07/15/91 40w0d       MEENA   4 Term 08/10/90 40w0d    CS   MEENA   3 Term 84 40w0d       MEENA   2 Term 10/02/82 40w0d       MEENA   1 SAB 80     SAB   DEC     Past Medical History:   Diagnosis Date     ASCUS on Pap smear     + HPV , colp WNL     Cervical high risk human papillomavirus (HPV) DNA test  positive , 18    Unable to type. 18: See problem list.      Herpes simplex without mention of complication     no recent outbreaks     Other and unspecified hyperlipidemia     elevated chol, ratio WNL     Papanicolaou smear of cervix with atypical squamous cells of undetermined significance (ASC-US)     +Endometrial cells  and 2002 US neg     Varicella without mention of complication     Chickenpox     Past Surgical History:   Procedure Laterality Date     ABDOMINOPLASTY       C NONSPECIFIC PROCEDURE  82,84,91    Vaginal delivery x3     C NONSPECIFIC PROCEDURE           C NONSPECIFIC PROCEDURE  81    SAB     COLONOSCOPY  14    negative, repeat in 10 years     hernia  age 3 or 4    hernia repair     MAMMOPLASTY AUGMENTATION BILATERAL       Family History   Problem Relation Age of Onset     Respiratory Mother         emphysema     Eye Disorder Mother         glaucoma     Psychotic Disorder Mother         depression     Depression Mother      Cancer Mother      Hypertension Father      Cancer Father         pancreatic CA-  72     Other - See Comments Sister         fibromyalgia     Diabetes Maternal Grandfather      Gastrointestinal Disease Daughter         ulcers     Breast Cancer Maternal Aunt         Unsure what age or outcome     Alcohol/Drug Brother         Alcohol     Liver Disease Brother      Depression Daughter      Other - See Comments Other         similar to MS     Social History     Socioeconomic History     Marital status:      Spouse name: None     Number of children: 4     Years of education: 12     Highest education level: None   Occupational History     None   Social Needs     Financial resource strain: None     Food insecurity:     Worry: None     Inability: None     Transportation needs:     Medical: None     Non-medical: None   Tobacco Use     Smoking status: Never Smoker     Smokeless tobacco: Never Used   Substance and Sexual Activity      Alcohol use: Yes     Comment: 1-2 per month     Drug use: No     Sexual activity: Yes     Partners: Male     Birth control/protection: Surgical, Post-menopausal     Comment: vasectomy   Lifestyle     Physical activity:     Days per week: None     Minutes per session: None     Stress: None   Relationships     Social connections:     Talks on phone: None     Gets together: None     Attends Jew service: None     Active member of club or organization: None     Attends meetings of clubs or organizations: None     Relationship status: None     Intimate partner violence:     Fear of current or ex partner: None     Emotionally abused: None     Physically abused: None     Forced sexual activity: None   Other Topics Concern      Service No     Blood Transfusions No     Caffeine Concern No     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 No   Social History Narrative    Caffeine intake/servings daily - 0-1 cup of coffee    Calcium intake/servings daily - 0    Exercise 0 times weekly - describe weights, going to start again    Sunscreen used - No    Seatbelts used - Yes    Guns stored in the home - No    Self Breast Exam - No    Pap test up to date -  Yes, as of today    Eye exam up to date -  No    Dental exam up to date -  Yes    DEXA scan up to date -  Not Applicable    Flex Sig/Colonoscopy up to date -  Not Applicable    Mammography up to date -  No    Immunizations reviewed and up to date - Unsure    Abuse: Current or Past (Physical, Sexual or Emotional) - No    Do you feel safe in your environment - Yes    Do you cope well with stress - Yes    Do you suffer from insomnia - No     Last updated by: Natali Osborne MA 9/16/11                           Current Outpatient Medications:      estradiol (ESTRACE) 1 MG tablet, Take 1 tablet (1 mg) by mouth daily, Disp: 90 tablet, Rfl: 3     medroxyPROGESTERone (PROVERA) 2.5 MG tablet, Take 1 tablet (2.5 mg) by mouth daily, Disp: 90 tablet, Rfl: 3     Probiotic Product (PROBIOTIC PO), Take  by mouth., Disp: , Rfl:      simethicone (MYLICON) 125 MG CHEW chewable tablet, Take 1 tablet (125 mg) by mouth 3 times daily (with meals) (Patient not taking: Reported on 6/22/2018), Disp: 90 tablet, Rfl: 1     Allergies   Allergen Reactions     No Known Drug Allergies        Past medical, surgical, social and family history were reviewed and updated in EPIC.    ROS:   C:       NEGATIVE for fever, chills, change in weight  I:         NEGATIVE for worrisome rashes, moles or lesions  E:       NEGATIVE for vision changes or irritation  E/M:   NEGATIVE for ear, mouth and throat problems  R:       NEGATIVE for significant cough or SOB  CV:     NEGATIVE for chest pain, palpitations or peripheral edema  GI:      NEGATIVE for nausea, abdominal pain, heartburn, or change in bowel habits  :    NEGATIVE for frequency, dysuria, hematuria, vaginal discharge, or bleeding  M:       NEGATIVE for significant arthralgias or myalgia  N:       NEGATIVE for weakness, dizziness or paresthesias  E:       NEGATIVE for temperature intolerance, skin/hair changes  P:       NEGATIVE for changes in mood or affect    EXAM:  LMP 12/01/2011    BMI: There is no height or weight on file to calculate BMI.  Constitutional: healthy, alert and no distress  Head: Normocephalic. No masses, lesions, tenderness or abnormalities  Neck: Neck supple. Trachea midline. No adenopathy. Thyroid symmetric, normal size.   Cardiovascular: RRR.   Respiratory: Negative.   Breast: s/p implants. No nodularity, asymmetry or nipple discharge bilaterally.  Gastrointestinal: Abdomen soft, non-tender, non-distended. No masses,  organomegaly  :  Vulva:  No external lesions, normal female hair distribution, no inguinal adenopathy.    Urethra:  Midline, non-tender, well supported, no discharge  Vagina:  Atrophic, no abnormal discharge, no lesions  Uterus:  Normal size , non-tender, freely mobile  Ovaries:  No masses appreciated, non-tender, mobile  Rectal Exam: deferred  Musculoskeletal: extremities normal  Skin: no suspicious lesions or rashes  Psychiatric: Affect appropriate, cooperative,mentation appears normal.     COUNSELING:   Reviewed preventive health counseling, as reflected in patient instructions       Regular exercise       Healthy diet/nutrition       Safe sex practices/STD prevention   reports that she has never smoked. She has never used smokeless tobacco.    There is no height or weight on file to calculate BMI.      FRAX Risk Assessment  ASSESSMENT:  59 year old  with satisfactory annual exam    1. Encounter for gynecological examination without abnormal finding  - NEISSERIA GONORRHOEA PCR  - CHLAMYDIA TRACHOMATIS PCR  - HIV Antigen Antibody Combo    2. Cervical high risk human papillomavirus (HPV) DNA test positive  - Pap imaged thin layer screen with HPV - recommended age 30 - 65 years (select HPV order below)  - HPV High Risk Types DNA Cervical    3. Symptomatic menopausal or female climacteric states  - estradiol (ESTRACE) 1 MG tablet; Take 1 tablet (1 mg) by mouth daily  Dispense: 90 tablet; Refill: 3  - medroxyPROGESTERone (PROVERA) 2.5 MG tablet; Take 1 tablet (2.5 mg) by mouth daily  Dispense: 90 tablet; Refill: 3    Zehra Crowell MD

## 2019-06-28 NOTE — LETTER
July 1, 2019      Christine Yadav  [address] [place] [id number]        Dear MsJesseYadav,    We are writing to inform you of your test results.    Your test results fall within the expected range(s) or remain unchanged from previous results.  Please continue with current treatment plan.    Resulted Orders   NEISSERIA GONORRHOEA PCR   Result Value Ref Range    Specimen Descrip Cervix     N Gonorrhea PCR Negative NEG^Negative      Comment:      Negative for N. gonorrhoeae rRNA by transcription mediated amplification.  A negative result by transcription mediated amplification does not preclude   the presence of N. gonorrhoeae infection because results are dependent on   proper and adequate collection, absence of inhibitors, and sufficient rRNA to   be detected.     CHLAMYDIA TRACHOMATIS PCR   Result Value Ref Range    Specimen Description Cervix     Chlamydia Trachomatis PCR Negative NEG^Negative      Comment:      Negative for C. trachomatis rRNA by transcription mediated amplification.  A negative result by transcription mediated amplification does not preclude   the presence of C. trachomatis infection because results are dependent on   proper and adequate collection, absence of inhibitors, and sufficient rRNA to   be detected.     HIV Antigen Antibody Combo   Result Value Ref Range    HIV Antigen Antibody Combo Nonreactive NR^Nonreactive          Comment:      HIV-1 p24 Ag & HIV-1/HIV-2 Ab Not Detected       If you have any questions or concerns, please call the clinic at the number listed above.       Sincerely,        Zehra Crowell MD

## 2019-07-02 ENCOUNTER — TELEPHONE (OUTPATIENT)
Dept: OBGYN | Facility: CLINIC | Age: 59
End: 2019-07-02

## 2019-07-02 LAB
COPATH REPORT: NORMAL
PAP: NORMAL

## 2019-07-02 NOTE — TELEPHONE ENCOUNTER
Patient calling to get test results. Discussed current results that are back. Pt asked if syphilis and trichomonas were tested. Informed pt they were not. Pt asked why as she asked for STD testing. Informed her I did not know why they were not ordered. Pt asked if she had an infection going on if her pap would come back abnormal. Discussed if pap was abnormal, most likely caused by HPV. Routing to . Please call patient to discuss when you have a chance. Thanks.   Salina Sun, RN-BSN

## 2019-07-02 NOTE — TELEPHONE ENCOUNTER
I tried to call patient back 2x. Phone line busy. Will try again tomorrow. We discussed which tests were being done (GC/dannie, HIV) at time of visit, she did ask for those other tests.  Zehra Crowell MD

## 2019-07-05 NOTE — TELEPHONE ENCOUNTER
Patient calling Dr. Crowell back. Please call when able - aware you are not in clinic until next week.  Nella Salazar

## 2019-07-06 NOTE — TELEPHONE ENCOUNTER
Called patient back.   Discussed STI testing, questions answered.  Questions about HPV results. Discussed that I recommend colposcopy.  Zehra Crowell MD

## 2019-07-19 ENCOUNTER — TELEPHONE (OUTPATIENT)
Dept: OBGYN | Facility: CLINIC | Age: 59
End: 2019-07-19

## 2019-07-19 ENCOUNTER — OFFICE VISIT (OUTPATIENT)
Dept: OBGYN | Facility: CLINIC | Age: 59
End: 2019-07-19
Payer: COMMERCIAL

## 2019-07-19 VITALS
BODY MASS INDEX: 21.87 KG/M2 | DIASTOLIC BLOOD PRESSURE: 77 MMHG | HEART RATE: 73 BPM | WEIGHT: 112 LBS | SYSTOLIC BLOOD PRESSURE: 117 MMHG

## 2019-07-19 DIAGNOSIS — Z13.220 SCREENING CHOLESTEROL LEVEL: Primary | ICD-10-CM

## 2019-07-19 DIAGNOSIS — R87.810 CERVICAL HIGH RISK HUMAN PAPILLOMAVIRUS (HPV) DNA TEST POSITIVE: ICD-10-CM

## 2019-07-19 LAB
CHOLEST SERPL-MCNC: 193 MG/DL
HDLC SERPL-MCNC: 96 MG/DL
LDLC SERPL CALC-MCNC: 84 MG/DL
NONHDLC SERPL-MCNC: 97 MG/DL
TRIGL SERPL-MCNC: 64 MG/DL

## 2019-07-19 PROCEDURE — 80061 LIPID PANEL: CPT | Performed by: OBSTETRICS & GYNECOLOGY

## 2019-07-19 PROCEDURE — 88305 TISSUE EXAM BY PATHOLOGIST: CPT | Performed by: OBSTETRICS & GYNECOLOGY

## 2019-07-19 PROCEDURE — 57456 ENDOCERV CURETTAGE W/SCOPE: CPT | Performed by: OBSTETRICS & GYNECOLOGY

## 2019-07-19 PROCEDURE — 36415 COLL VENOUS BLD VENIPUNCTURE: CPT | Performed by: OBSTETRICS & GYNECOLOGY

## 2019-07-19 NOTE — NURSING NOTE
Chief Complaint   Patient presents with     Colposcopy       Initial /77   Pulse 73   Wt 50.8 kg (112 lb)   LMP 2011   BMI 21.87 kg/m   Estimated body mass index is 21.87 kg/m  as calculated from the following:    Height as of 18: 1.524 m (5').    Weight as of this encounter: 50.8 kg (112 lb).  BP completed using cuff size: regular    Questioned patient about current smoking habits.  Pt. has never smoked.          The following HM Due: NONE      The following patient reported/Care Every where data was sent to:  P ABSTRACT QUALITY INITIATIVES [[id number]]        N/a        Leslye Cuellar MA

## 2019-07-19 NOTE — PROGRESS NOTES
Christine Yadav is a 59 year old female  who presents for a repeat colposcopy, referred by Dr. Crowell . Pap smear 1 month ago showed: normal/negative with high risk HPV present: other. The prior pap showed with high risk HPV present: other.     Patient's last menstrual period was 2011.  UPT today is not done  Patient does not smoke  Type of contraception: none  Age at first sexual intercourse: 20  Number of sexual partners (lifetime): more than 6  Past GYN history: No STD history and HPV  Prior cervical/vaginal disease: Normal exam without visible pathology.  Prior cervical treatment: no treatment.    PROCEDURE:  Before the procedure, it was ensured that the patient was educated regarding the nature of her findings to date, the implications, and what was to be done. She has been made aware of the role of HPV, the natural history of infection, ways to minimize her future risk, the effect of HPV on the cervix, and treatment options available should they be indicated. The details of the   colposcopic procedure were reviewed. All questions were answered before proceeding, and informed consent was therefore obtained.  Speculum placed in vagina and excellent visualization of cervix   acheived, cervix swabbed x 3 with acetic acid solution.    FINDINGS:  Cervix: no visible lesions  Please refer to images section for details.  Pap repeated?: No  SCJ seen?: no   ECC done?: Yes  A sterile kevorkian curette was inserted into the cervix and curettage was done circumferentially.  Tissue obtained and placed in formalin.  Patient tolerated this well.   Lugol's solution used?: No  Satisfactory examination?: no  ASSESSMENT: Normal exam without visible pathology.  PLAN: specimen labelled and sent to Pathology.  If negative she will have Pap and HPV testing in one year.   All of the patients questions were answered to her apparent satisfaction    Nyla Hawley MD

## 2019-07-19 NOTE — TELEPHONE ENCOUNTER
Pt called and stated that she has some questions about her procedure that she is having this morning

## 2019-07-23 LAB — COPATH REPORT: NORMAL

## 2020-02-10 ENCOUNTER — RECORDS - HEALTHEAST (OUTPATIENT)
Dept: GENERAL RADIOLOGY | Facility: CLINIC | Age: 60
End: 2020-02-10

## 2020-02-10 ENCOUNTER — COMMUNICATION - HEALTHEAST (OUTPATIENT)
Dept: TELEHEALTH | Facility: CLINIC | Age: 60
End: 2020-02-10

## 2020-02-10 ENCOUNTER — OFFICE VISIT - HEALTHEAST (OUTPATIENT)
Dept: FAMILY MEDICINE | Facility: CLINIC | Age: 60
End: 2020-02-10

## 2020-02-10 DIAGNOSIS — M25.561 ACUTE PAIN OF RIGHT KNEE: ICD-10-CM

## 2020-02-10 DIAGNOSIS — M25.561 PAIN IN RIGHT KNEE: ICD-10-CM

## 2020-02-10 ASSESSMENT — MIFFLIN-ST. JEOR: SCORE: 984.58

## 2020-02-11 ENCOUNTER — RECORDS - HEALTHEAST (OUTPATIENT)
Dept: MULTI SPECIALTY CLINIC | Facility: CLINIC | Age: 60
End: 2020-02-11

## 2020-02-14 ENCOUNTER — COMMUNICATION - HEALTHEAST (OUTPATIENT)
Dept: FAMILY MEDICINE | Facility: CLINIC | Age: 60
End: 2020-02-14

## 2020-02-18 ENCOUNTER — OFFICE VISIT - HEALTHEAST (OUTPATIENT)
Dept: FAMILY MEDICINE | Facility: CLINIC | Age: 60
End: 2020-02-18

## 2020-02-18 DIAGNOSIS — M25.561 ACUTE PAIN OF RIGHT KNEE: ICD-10-CM

## 2020-04-30 ENCOUNTER — TELEPHONE (OUTPATIENT)
Dept: OBGYN | Facility: CLINIC | Age: 60
End: 2020-04-30

## 2020-04-30 NOTE — TELEPHONE ENCOUNTER
Missing a pill or changing the dose of HRT can cause spotting. Recommend she monitor it for now. If persistent (more than 1 week) or recurs, then  I recommend an ultrasound and appointment for evaluation of postmenopausal bleeding.   Thanks,  Zehra Crowell MD

## 2020-04-30 NOTE — TELEPHONE ENCOUNTER
Reason for call:  Other     Patient called regarding (reason for call): call back    Additional comments: Patient called and states she is on hormones and is having breakthrough bleeding. Patient is concerned and would like to speak to a nurse about this.    Phone number to reach patient:  Cell number on file:    Telephone Information:   Mobile [phone]       Best Time:  Anytime     Can we leave a detailed message on this number?  YES    Travel screening: Not Applicable

## 2020-04-30 NOTE — TELEPHONE ENCOUNTER
Patient currently on hormone replacements-provera and estradiol. The past couple of days she has had some light spotting, brownish in color. Prior to the spotting she noticed a vaginal odor similar to what she remembers noticing before she would get a period years ago. She said she may have accidentally taken 2 pills of the Provera a few days ago as she couldn't remember if she had taken a dose so took another one. She is wondering if this may have brought on the spotting. Please advise. Salina Lindsay RN

## 2020-06-08 ENCOUNTER — OFFICE VISIT - HEALTHEAST (OUTPATIENT)
Dept: FAMILY MEDICINE | Facility: CLINIC | Age: 60
End: 2020-06-08

## 2020-06-08 DIAGNOSIS — Z12.31 VISIT FOR SCREENING MAMMOGRAM: ICD-10-CM

## 2020-06-08 DIAGNOSIS — M25.511 ACUTE PAIN OF RIGHT SHOULDER: ICD-10-CM

## 2020-07-10 ENCOUNTER — OFFICE VISIT (OUTPATIENT)
Dept: OBGYN | Facility: CLINIC | Age: 60
End: 2020-07-10
Payer: COMMERCIAL

## 2020-07-10 VITALS
TEMPERATURE: 97.9 F | WEIGHT: 115 LBS | HEART RATE: 78 BPM | SYSTOLIC BLOOD PRESSURE: 135 MMHG | BODY MASS INDEX: 22.46 KG/M2 | DIASTOLIC BLOOD PRESSURE: 82 MMHG

## 2020-07-10 DIAGNOSIS — R87.810 CERVICAL HIGH RISK HUMAN PAPILLOMAVIRUS (HPV) DNA TEST POSITIVE: Primary | ICD-10-CM

## 2020-07-10 DIAGNOSIS — Z12.31 ENCOUNTER FOR SCREENING MAMMOGRAM FOR BREAST CANCER: ICD-10-CM

## 2020-07-10 DIAGNOSIS — N95.1 SYMPTOMATIC MENOPAUSAL OR FEMALE CLIMACTERIC STATES: ICD-10-CM

## 2020-07-10 PROCEDURE — 87624 HPV HI-RISK TYP POOLED RSLT: CPT | Performed by: OBSTETRICS & GYNECOLOGY

## 2020-07-10 PROCEDURE — 99213 OFFICE O/P EST LOW 20 MIN: CPT | Performed by: OBSTETRICS & GYNECOLOGY

## 2020-07-10 PROCEDURE — 88175 CYTOPATH C/V AUTO FLUID REDO: CPT | Performed by: OBSTETRICS & GYNECOLOGY

## 2020-07-10 RX ORDER — ESTRADIOL 1 MG/1
1 TABLET ORAL DAILY
Qty: 90 TABLET | Refills: 3 | Status: SHIPPED | OUTPATIENT
Start: 2020-07-10 | End: 2021-09-02

## 2020-07-10 RX ORDER — MEDROXYPROGESTERONE ACETATE 2.5 MG/1
2.5 TABLET ORAL DAILY
Qty: 90 TABLET | Refills: 3 | Status: SHIPPED | OUTPATIENT
Start: 2020-07-10 | End: 2021-09-02

## 2020-07-10 NOTE — PROGRESS NOTES
GYN CLINIC VISIT  7/10/2020  CC: pap and HRT    HPI:  Christine Yadav is a 60 year old  who presents for pap smear and to discuss HRT.    Pap history:  - ASCUS with HPV  - Endometrial cells assoc with menses  - ASCUS with HPV--colp WNL  NIL paps: , , 10/09, 10/10, , . Plan pap in 3 yrs.  07/09/15: NIL pap, Neg HR HPV result.   18: NIL pap, + HR HPV (not 16 or 18) result. Plan cotest in 1 year.  19 NIL pap, + HR HPV (not 16 or 18). Plan: colpo  19: [facility] very scant benign squamous epithelium without atypia. Plan cotest in 1 year.     On HRT. Works pretty well but still has a few hot flushes a day. Does not want to decrease dose. Had one episode of bleeding in past year when she forgot to take some pills.     Vitals:    07/10/20 1107   BP: 135/82   BP Location: Left arm   Patient Position: Sitting   Cuff Size: Adult Regular   Pulse: 78   Temp: 97.9  F (36.6  C)   TempSrc: Oral   Weight: 52.2 kg (115 lb)     Gen: alert, oriented, no distress,  pleasant, appears stated age, well groomed  : normal external genitalia without lesions or erythema; normal, well supported urethra, normal Bartholins, normal Skenes; normal pink rugated vaginal mucosa, no lesions or abnormal discharge; medium alberta speculum inserted without difficulty; normal appearing cervix without lesions, bleeding or discharge. Bimanual exam shows 6week sized anteverted uterus, mobile, no fundal tenderness, no CMT, no adnexal masses or tenderness.  Extr: warm, well perfused, nontender, no edema  Psych: affect bright, cooperative, responds appropriately      ASSESSMENT: 60 year old  here for pap and HRT refill      PLAN:  1. Cervical high risk human papillomavirus (HPV) DNA test positive  - Pap imaged thin layer diagnostic with HPV (select HPV order below)  - HPV High Risk Types DNA Cervical    2. Symptomatic menopausal or female climacteric states  - medroxyPROGESTERone (PROVERA) 2.5 MG  tablet; Take 1 tablet (2.5 mg) by mouth daily  Dispense: 90 tablet; Refill: 3  - estradiol (ESTRACE) 1 MG tablet; Take 1 tablet (1 mg) by mouth daily  Dispense: 90 tablet; Refill: 3    Hormone replacement therapy risk and benefits were discussed in view of [facility] data and subsequent studies.  She has been using it primarily for quality of life issues - hot flashes and night sweats that  were disruptive to general wellbeing. Discussed risks of HRT including thromboembolic disease and breast cancer. Discussed importance of progesterone component to protect uterine lining. Discussed recommendation of using lowest dose for shortest time needed to relieve symptoms. Plan to re-evaluate yearly.     3. Encounter for screening mammogram for breast cancer  - *[facility] Screening Digital Bilateral; Future      Zehra Crowell MD

## 2020-07-10 NOTE — LETTER
July 22, 2020    Christine Yadav  [address] [place] [id number]    Dear ,  This letter is regarding your recent Pap smear (cervical cancer screening) and Human Papillomavirus (HPV) test.  We are happy to inform you that your Pap smear result is normal. Cervical cancer is closely linked with certain types of HPV. Your results showed no evidence of high-risk HPV.  We recommend you have your next PAP smear and HPV test in 3 years.  You will still need to return to the clinic every year for an annual exam and other preventive tests.  If you have additional questions regarding this result, please call our registered nurse, Tonya at [phone].  Sincerely,    Zehra Crowell MD //dylon

## 2020-07-10 NOTE — NURSING NOTE
Chief Complaint   Patient presents with     Gyn Exam       Initial /82 (BP Location: Left arm, Patient Position: Sitting, Cuff Size: Adult Regular)   Pulse 78   Temp 97.9  F (36.6  C) (Oral)   Wt 52.2 kg (115 lb)   LMP 2011   BMI 22.46 kg/m   Estimated body mass index is 22.46 kg/m  as calculated from the following:    Height as of 18: 1.524 m (5').    Weight as of this encounter: 52.2 kg (115 lb).  BP completed using cuff size: regular    Questioned patient about current smoking habits.  Pt. has never smoked.          The following HM Due: pap smear      The following patient reported/Care Every where data was sent to:  P ABSTRACT QUALITY INITIATIVES [[id number]]  AMY Cole MA

## 2020-07-15 LAB
COPATH REPORT: NORMAL
PAP: NORMAL

## 2020-07-17 LAB
FINAL DIAGNOSIS: NORMAL
HPV HR 12 DNA CVX QL NAA+PROBE: NEGATIVE
HPV16 DNA SPEC QL NAA+PROBE: NEGATIVE
HPV18 DNA SPEC QL NAA+PROBE: NEGATIVE
SPECIMEN DESCRIPTION: NORMAL
SPECIMEN SOURCE CVX/VAG CYTO: NORMAL

## 2020-09-04 ENCOUNTER — OFFICE VISIT (OUTPATIENT)
Dept: FAMILY MEDICINE | Facility: CLINIC | Age: 60
End: 2020-09-04
Payer: COMMERCIAL

## 2020-09-04 ENCOUNTER — TELEPHONE (OUTPATIENT)
Dept: FAMILY MEDICINE | Facility: CLINIC | Age: 60
End: 2020-09-04

## 2020-09-04 VITALS
OXYGEN SATURATION: 100 % | DIASTOLIC BLOOD PRESSURE: 76 MMHG | HEART RATE: 64 BPM | BODY MASS INDEX: 22.98 KG/M2 | TEMPERATURE: 98 F | SYSTOLIC BLOOD PRESSURE: 121 MMHG | WEIGHT: 114 LBS | HEIGHT: 59 IN

## 2020-09-04 DIAGNOSIS — R14.1 FLATULENCE, ERUCTATION AND GAS PAIN: ICD-10-CM

## 2020-09-04 DIAGNOSIS — R14.3 FLATULENCE, ERUCTATION AND GAS PAIN: ICD-10-CM

## 2020-09-04 DIAGNOSIS — Z00.00 ROUTINE GENERAL MEDICAL EXAMINATION AT A HEALTH CARE FACILITY: Primary | ICD-10-CM

## 2020-09-04 DIAGNOSIS — R14.2 FLATULENCE, ERUCTATION AND GAS PAIN: ICD-10-CM

## 2020-09-04 DIAGNOSIS — Z12.31 ENCOUNTER FOR SCREENING MAMMOGRAM FOR BREAST CANCER: ICD-10-CM

## 2020-09-04 LAB
ALBUMIN SERPL-MCNC: 4.2 G/DL (ref 3.4–5)
ALP SERPL-CCNC: 49 U/L (ref 40–150)
ALT SERPL W P-5'-P-CCNC: 20 U/L (ref 0–50)
ANION GAP SERPL CALCULATED.3IONS-SCNC: 6 MMOL/L (ref 3–14)
AST SERPL W P-5'-P-CCNC: 13 U/L (ref 0–45)
BASOPHILS # BLD AUTO: 0 10E9/L (ref 0–0.2)
BASOPHILS NFR BLD AUTO: 0.8 %
BILIRUB SERPL-MCNC: 0.5 MG/DL (ref 0.2–1.3)
BUN SERPL-MCNC: 10 MG/DL (ref 7–30)
CALCIUM SERPL-MCNC: 9 MG/DL (ref 8.5–10.1)
CHLORIDE SERPL-SCNC: 106 MMOL/L (ref 94–109)
CHOLEST SERPL-MCNC: 232 MG/DL
CO2 SERPL-SCNC: 28 MMOL/L (ref 20–32)
CREAT SERPL-MCNC: 0.84 MG/DL (ref 0.52–1.04)
DIFFERENTIAL METHOD BLD: NORMAL
EOSINOPHIL # BLD AUTO: 0.1 10E9/L (ref 0–0.7)
EOSINOPHIL NFR BLD AUTO: 2.4 %
ERYTHROCYTE [DISTWIDTH] IN BLOOD BY AUTOMATED COUNT: 12.7 % (ref 10–15)
GFR SERPL CREATININE-BSD FRML MDRD: 76 ML/MIN/{1.73_M2}
GLUCOSE SERPL-MCNC: 96 MG/DL (ref 70–99)
HCT VFR BLD AUTO: 45.6 % (ref 35–47)
HDLC SERPL-MCNC: 114 MG/DL
HGB BLD-MCNC: 15 G/DL (ref 11.7–15.7)
LDLC SERPL CALC-MCNC: 100 MG/DL
LYMPHOCYTES # BLD AUTO: 1.4 10E9/L (ref 0.8–5.3)
LYMPHOCYTES NFR BLD AUTO: 27.9 %
MCH RBC QN AUTO: 30 PG (ref 26.5–33)
MCHC RBC AUTO-ENTMCNC: 32.9 G/DL (ref 31.5–36.5)
MCV RBC AUTO: 91 FL (ref 78–100)
MONOCYTES # BLD AUTO: 0.4 10E9/L (ref 0–1.3)
MONOCYTES NFR BLD AUTO: 7.4 %
NEUTROPHILS # BLD AUTO: 3.1 10E9/L (ref 1.6–8.3)
NEUTROPHILS NFR BLD AUTO: 61.5 %
NONHDLC SERPL-MCNC: 118 MG/DL
PLATELET # BLD AUTO: 231 10E9/L (ref 150–450)
POTASSIUM SERPL-SCNC: 4.4 MMOL/L (ref 3.4–5.3)
PROT SERPL-MCNC: 7.8 G/DL (ref 6.8–8.8)
RBC # BLD AUTO: 5 10E12/L (ref 3.8–5.2)
SODIUM SERPL-SCNC: 140 MMOL/L (ref 133–144)
TRIGL SERPL-MCNC: 88 MG/DL
TSH SERPL DL<=0.005 MIU/L-ACNC: 0.7 MU/L (ref 0.4–4)
VIT B12 SERPL-MCNC: 163 PG/ML (ref 193–986)
WBC # BLD AUTO: 5 10E9/L (ref 4–11)

## 2020-09-04 PROCEDURE — 83516 IMMUNOASSAY NONANTIBODY: CPT | Performed by: PREVENTIVE MEDICINE

## 2020-09-04 PROCEDURE — 36415 COLL VENOUS BLD VENIPUNCTURE: CPT | Performed by: PREVENTIVE MEDICINE

## 2020-09-04 PROCEDURE — 80061 LIPID PANEL: CPT | Performed by: PREVENTIVE MEDICINE

## 2020-09-04 PROCEDURE — 99396 PREV VISIT EST AGE 40-64: CPT | Performed by: PREVENTIVE MEDICINE

## 2020-09-04 PROCEDURE — 99213 OFFICE O/P EST LOW 20 MIN: CPT | Mod: 25 | Performed by: PREVENTIVE MEDICINE

## 2020-09-04 PROCEDURE — 82607 VITAMIN B-12: CPT | Performed by: PREVENTIVE MEDICINE

## 2020-09-04 PROCEDURE — 80050 GENERAL HEALTH PANEL: CPT | Performed by: PREVENTIVE MEDICINE

## 2020-09-04 ASSESSMENT — MIFFLIN-ST. JEOR: SCORE: 998.76

## 2020-09-04 ASSESSMENT — PAIN SCALES - GENERAL: PAINLEVEL: NO PAIN (0)
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
## 2020-09-04 NOTE — TELEPHONE ENCOUNTER
Called patient and reviewed labs with her. Will also send a letter with results.  Jenny Silver MD MPH

## 2020-09-04 NOTE — TELEPHONE ENCOUNTER
Reason for Call:  Request for results:    Name of test or procedure: Tests from todays visit    Date of test of procedure: 9.4.2020    Location of the test or procedure: [facility] to leave the result message on voice mail or with a family member? YES    Phone number Patient can be reached at:  Cell number on file:    Telephone Information:   Mobile [phone]       Additional comments: Patient is requesting a call back on todays tests that were performed.    Call taken on 9/4/2020 at 12:59 PM by Genesis Fletcher

## 2020-09-04 NOTE — PATIENT INSTRUCTIONS
At [facility], we strive to deliver an exceptional experience to you, every time we see you. If you receive a survey, please complete it as we do value your feedback.  If you have MyChart, you can expect to receive results automatically within 24 hours of their completion.  Your provider will send a note interpreting your results as well.   If you do not have MyChart, you should receive your results in about a week by mail.    Your care team:                            Family Medicine Internal Medicine   MD Florentino Daugherty, MD Antony Currie MD Katya Georgiev PA-C  Margaret Maddox, APRN CNP    Roger Reno, MD Pediatrics   Jonel Rubalcava, PACaraC  Naida Young, CNP MD Renetta Alaniz APRN CNP   MD Christine Dumont MD Deborah Mielke, MD Yessi Lovell, APRN CNP  Razia Roa, PAEDIL Perez, CNP  MD Fatimah Cordova MD Angela Wermerskirchen, MD      Clinic hours: Monday - Thursday 7 am-7 pm; Fridays 7 am-5 pm.   Urgent care: Monday - Friday 11 am-9 pm; Saturday and Sunday 9 am-5 pm.    Clinic: [phone]       [facility]: Monday - Thursday 8 am - 7 pm; Friday 8 am - 6 pm  [facility]: [phone]     Use www.[email or web address] for 24/7 diagnosis and treatment of dozens of conditions.    Preventive Health Recommendations  Female Ages 50 - 64    Yearly exam: See your health care provider every year in order to  o Review health changes.   o Discuss preventive care.    o Review your medicines if your doctor has prescribed any.      Get a Pap test every three years (unless you have an abnormal result and your provider advises testing more often).    If you get Pap tests with HPV test, you only need to test every 5 years, unless you have an abnormal result.     You do not need a Pap test if your uterus was removed (hysterectomy) and you have  not had cancer.    You should be tested each year for STDs (sexually transmitted diseases) if you're at risk.     Have a mammogram every 1 to 2 years.    Have a colonoscopy at age 50, or have a yearly FIT test (stool test). These exams screen for colon cancer.      Have a cholesterol test every 5 years, or more often if advised.    Have a diabetes test (fasting glucose) every three years. If you are at risk for diabetes, you should have this test more often.     If you are at risk for osteoporosis (brittle bone disease), think about having a bone density scan (DEXA).    Shots: Get a flu shot each year. Get a tetanus shot every 10 years.    Nutrition:     Eat at least 5 servings of fruits and vegetables each day.    Eat whole-grain bread, whole-wheat pasta and brown rice instead of white grains and rice.    Get adequate Calcium and Vitamin D.     Lifestyle    Exercise at least 150 minutes a week (30 minutes a day, 5 days a week). This will help you control your weight and prevent disease.    Limit alcohol to one drink per day.    No smoking.     Wear sunscreen to prevent skin cancer.     See your dentist every six months for an exam and cleaning.    See your eye doctor every 1 to 2 years.

## 2020-09-08 DIAGNOSIS — E53.8 VITAMIN B12 DEFICIENCY (NON ANEMIC): Primary | ICD-10-CM

## 2020-09-08 LAB
TTG IGA SER-ACNC: <1 U/ML
TTG IGG SER-ACNC: <1 U/ML

## 2020-09-08 NOTE — RESULT ENCOUNTER NOTE
Please send a letter:    Dear Christine Yadav,    Test for Gluten sensitivity did not show any abnormalities.  Vitamin B 12 was low, as we had discussed on the phone, please take Vitamin B12 over the counter in a dose of 1000 MCG daily for 3 months. I have placed orders for recheck in 3 months, you can come in for a lab only visit.  Electrolytes, glucose, kidney function, liver function and thyroid function are normal.  LDL cholesterol is at goal for you.  Basic blood count did not show anemia or infection.  Please let me know if you have any questions and thank you for choosing [facility].     Regards,    Jenny Silver MD MPH

## 2020-09-10 ENCOUNTER — TELEPHONE (OUTPATIENT)
Dept: FAMILY MEDICINE | Facility: CLINIC | Age: 60
End: 2020-09-10

## 2020-09-10 NOTE — TELEPHONE ENCOUNTER
Reason for Call:  Other prescription    Detailed comments: Pt calling for she has a question regarding a B-12 vitamin she is taking and would like a call back to discus further    Phone Number Patient can be reached at: Home number on file [phone] (home)    Best Time: anytime    Can we leave a detailed message on this number? YES    Call taken on 9/10/2020 at 9:43 AM by Abad Armenta          .

## 2020-09-11 ENCOUNTER — COMMUNICATION - HEALTHEAST (OUTPATIENT)
Dept: FAMILY MEDICINE | Facility: CLINIC | Age: 60
End: 2020-09-11

## 2020-09-11 NOTE — TELEPHONE ENCOUNTER
This writer attempted to contact Christine on 09/11/20      Reason for call returning patient's call and left message.      If patient calls back:   Registered Nurse called. Follow Triage Call workflow        Ashley Garnica RN

## 2020-09-11 NOTE — TELEPHONE ENCOUNTER
Reason for Call:  Other call back    Detailed comments: Pt called again regarding the issue below. Thank you    Phone Number Patient can be reached at: Home number on file [phone] (home)    Best Time: Any    Can we leave a detailed message on this number? YES    Call taken on 9/11/2020 at 9:23 AM by Nemo Goodwin

## 2020-09-14 NOTE — TELEPHONE ENCOUNTER
Reason for Call:  Other returning call    Detailed comments: Transferring to rn line    Phone Number Patient can be reached at: Home number on file [phone] (home)    Best Time: Any    Can we leave a detailed message on this number? YES    Call taken on 9/14/2020 at 9:44 AM by Nemo Goodwin

## 2020-09-14 NOTE — TELEPHONE ENCOUNTER
This writer attempted to contact Christine on 09/14/20      Reason for call return patient's call and left message.      If patient calls back:   Registered Nurse called. Follow Triage Call workflow        Ashley Garnica RN

## 2020-09-14 NOTE — TELEPHONE ENCOUNTER
Thank you for the clarification. OK to take 2500 MCG every other day if that is what she has.   Jenny Silver MD MPH

## 2020-09-14 NOTE — TELEPHONE ENCOUNTER
Called and spoke with patient. Advised of below, per provider, in regards to taking 2500 mcg dose, every other day.  Patient agreed with the plan, will repeat blood test in 3 months as previously recommended.  No other questions at this time from patient.    Yeimy Garza RN  [facility]

## 2020-09-14 NOTE — TELEPHONE ENCOUNTER
Took call back from RN line.  Spoke with patient. She had her Vitamin B12 level recently tested and provider advised for patient to take 1000 mcg daily x 3 months.  Patient was unable to get the 1000 mcg dose, instead picked up 2500 mcg dose. Patient noted that she knows someone she works with that also takes B12 for deficiency and that person takes the 2500 mcg Every Other Day. Patient is wondering if this is what she should do too, since this is the dose she has on hand, not the recommended 1000 mcg provider advised.  Patient wondering how she should then take the 2500 mcg dose.   Okay to call patient back at [phone] and leave a message if she does not answer.    Routing to provider to review and advise.    Yeimy Garza RN  [facility]/ [facility]

## 2020-10-20 ENCOUNTER — TELEPHONE (OUTPATIENT)
Dept: FAMILY MEDICINE | Facility: CLINIC | Age: 60
End: 2020-10-20

## 2020-10-20 NOTE — TELEPHONE ENCOUNTER
Reason for Call:  Other call back    Detailed comments: Was recently in and had labs drawn. Wondering if you could tell what her blood type is. Please call to advise. Thank you     Phone Number Patient can be reached at: Cell number on file:    Telephone Information:   Mobile [phone]       Best Time: Any    Can we leave a detailed message on this number? YES    Call taken on 10/20/2020 at 9:26 AM by Giuseppe Melgar

## 2020-10-20 NOTE — TELEPHONE ENCOUNTER
Chart reviewed.  No record of blood type on file.  Detailed message left on voicemail at number given, to notify patient. Kalyn Rose CMA(AAMA).

## 2020-12-29 DIAGNOSIS — E53.8 VITAMIN B12 DEFICIENCY (NON ANEMIC): ICD-10-CM

## 2020-12-29 PROCEDURE — 36415 COLL VENOUS BLD VENIPUNCTURE: CPT | Performed by: PREVENTIVE MEDICINE

## 2020-12-29 PROCEDURE — 82607 VITAMIN B-12: CPT | Performed by: PREVENTIVE MEDICINE

## 2020-12-30 LAB — VIT B12 SERPL-MCNC: 748 PG/ML (ref 193–986)

## 2021-01-10 ENCOUNTER — HEALTH MAINTENANCE LETTER (OUTPATIENT)
Age: 61
End: 2021-01-10

## 2021-02-16 ENCOUNTER — HOSPITAL ENCOUNTER (OUTPATIENT)
Dept: MAMMOGRAPHY | Facility: CLINIC | Age: 61
Discharge: HOME | End: 2021-02-16
Attending: PREVENTIVE MEDICINE | Admitting: PREVENTIVE MEDICINE
Payer: COMMERCIAL

## 2021-02-16 DIAGNOSIS — Z12.31 ENCOUNTER FOR SCREENING MAMMOGRAM FOR BREAST CANCER: ICD-10-CM

## 2021-02-16 PROCEDURE — 77067 SCR MAMMO BI INCL CAD: CPT

## 2021-02-25 ENCOUNTER — MYC MEDICAL ADVICE (OUTPATIENT)
Dept: FAMILY MEDICINE | Facility: CLINIC | Age: 61
End: 2021-02-25

## 2021-04-20 ENCOUNTER — NURSE TRIAGE (OUTPATIENT)
Dept: NURSING | Facility: CLINIC | Age: 61
End: 2021-04-20

## 2021-04-20 NOTE — TELEPHONE ENCOUNTER
"Patient calling reporting symptoms of \"a cold.\" Nasal congestion, cough, afebrile. Patient has completed her COVID 19 vaccine series in 1/2021.   Denies known exposure to COVID 19.    Transferred to Central Scheduling.      Zehra Mendez RN  [facility]      COVID 19 Nurse Triage Plan/Patient Instructions    Please be aware that novel coronavirus (COVID-19) may be circulating in the community. If you develop symptoms such as fever, cough, or SOB or if you have concerns about the presence of another infection including coronavirus (COVID-19), please contact your health care provider or visit [email or web address].     Disposition/Instructions    Virtual Visit with provider recommended. Reference Visit Selection Guide.    Thank you for taking steps to prevent the spread of this virus.  o Limit your contact with others.  o Wear a simple mask to cover your cough.  o Wash your hands well and often.    Resources    [facility]: About COVID-19: [email or web address]    CDC: What to Do If You're Sick: www.cdc.gov/coronavirus/2019-ncov/about/steps-when-sick.html    CDC: Ending Home Isolation: www.cdc.gov/coronavirus/2019-ncov/hcp/disposition-in-home-patients.html     CDC: Caring for Someone: www.cdc.gov/coronavirus/2019-ncov/if-you-are-sick/care-for-someone.html     [facility]: Interim Guidance for Hospital Discharge to Home: www.health.[facility].[place]/diseases/coronavirus/hcp/hospdischarge.pdf    [facility] clinical trials (COVID-19 research studies): [email or web address].[email or web address]-clinical-trials     Below are the COVID-19 hotlines at the [place] Department of Health ([facility]). Interpreters are available.   o For health questions: Call 677-029-2578 or 1-363.147.8726 (7 a.m. to 7 p.m.)  o For questions about schools and childcare: Call 853-581-8291 or 1-684.998.3129 (7 a.m. to 7 p.m.)                   Reason for Disposition    [1] COVID-19 infection suspected by caller or triager AND [2] " mild symptoms (cough, fever, or others) AND [3] no complications or SOB    Additional Information    Negative: SEVERE difficulty breathing (e.g., struggling for each breath, speaks in single words)    Negative: Difficult to awaken or acting confused (e.g., disoriented, slurred speech)    Negative: Bluish (or gray) lips or face now    Negative: Shock suspected (e.g., cold/pale/clammy skin, too weak to stand, low BP, rapid pulse)    Negative: Sounds like a life-threatening emergency to the triager    Negative: [1] COVID-19 exposure AND [2] no symptoms    Negative: [1] Lives with someone known to have influenza (flu test positive) AND [2] flu-like symptoms (e.g., cough, runny nose, sore throat, SOB; with or without fever)    Negative: [1] Adult with possible COVID-19 symptoms AND [2] triager concerned about severity of symptoms or other causes    Negative: COVID-19 vaccine reaction suspected (e.g., fever, headache, muscle aches) occurring during days 1-3 after getting vaccine    Negative: COVID-19 vaccine, questions about    Negative: COVID-19 and breastfeeding, questions about    Negative: SEVERE or constant chest pain or pressure (Exception: mild central chest pain, present only when coughing)    Negative: MODERATE difficulty breathing (e.g., speaks in phrases, SOB even at rest, pulse 100-120)    Negative: [1] Headache AND [2] stiff neck (can't touch chin to chest)    Negative: MILD difficulty breathing (e.g., minimal/no SOB at rest, SOB with walking, pulse <100)    Negative: Chest pain or pressure    Negative: Patient sounds very sick or weak to the triager    Negative: Fever > 103 F (39.4 C)    Negative: [1] Fever > 101 F (38.3 C) AND [2] age > 60    Negative: [1] Fever > 100.0 F (37.8 C) AND [2] bedridden (e.g., nursing home patient, CVA, chronic illness, recovering from surgery)    Negative: [1] HIGH RISK patient (e.g., age > 64 years, diabetes, heart or lung disease, weak immune system) AND [2] new or worsening  symptoms    Negative: [1] HIGH RISK patient AND [2] influenza is widespread in the community AND [3] ONE OR MORE respiratory symptoms: cough, sore throat, runny or stuffy nose    Negative: [1] HIGH RISK patient AND [2] influenza exposure within the last 7 days AND [3] ONE OR MORE respiratory symptoms: cough, sore throat, runny or stuffy nose    Negative: Fever present > 3 days (72 hours)    Negative: [1] Fever returns after gone for over 24 hours AND [2] symptoms worse or not improved    Negative: [1] Continuous (nonstop) coughing interferes with work or school AND [2] no improvement using cough treatment per protocol    Protocols used: CORONAVIRUS (COVID-19) DIAGNOSED OR [id number] 1.3

## 2021-04-26 ENCOUNTER — MYC MEDICAL ADVICE (OUTPATIENT)
Dept: FAMILY MEDICINE | Facility: CLINIC | Age: 61
End: 2021-04-26

## 2021-06-04 VITALS
SYSTOLIC BLOOD PRESSURE: 105 MMHG | DIASTOLIC BLOOD PRESSURE: 62 MMHG | BODY MASS INDEX: 24.35 KG/M2 | WEIGHT: 115.7 LBS | HEART RATE: 85 BPM | OXYGEN SATURATION: 99 %

## 2021-06-04 VITALS
DIASTOLIC BLOOD PRESSURE: 60 MMHG | SYSTOLIC BLOOD PRESSURE: 108 MMHG | WEIGHT: 115.3 LBS | OXYGEN SATURATION: 100 % | HEART RATE: 78 BPM | HEIGHT: 58 IN | BODY MASS INDEX: 24.2 KG/M2

## 2021-06-06 NOTE — PROGRESS NOTES
"Chief Complaint   Patient presents with     Knee Pain     Patient has been having right knee pain and some swelling since Thursday night. No known injury.        HPI: 59-year-old female with a past medical history of abnormal Pap smear, presents today with new onset right knee pain for the past 4 days in duration of moderate intensity.  She woke up Thursday morning from sleeping with right knee pain.  There is minimal swelling but it is painful to flex and extend her right knee and is painful to bear weight.  She is never had surgery on this knee.  There was no trauma to her recollection.  It hurts to bear weight and going up and down stairs.    ROS: 10 point system review complete negative for fever or chills.  Negative for erythema.  Negative for distal paresthesias.  Care everywhere reviewed notable for ASCUS Pap.    SH: Reviewed-see Snapshot for review.     FH: Reviewed-see Snapshot for review.    Meds:  Christine has a current medication list which includes the following prescription(s): estradiol, medroxyprogesterone, and naproxen.    O:  /60 (Patient Site: Right Arm, Patient Position: Sitting, Cuff Size: Adult Regular)   Pulse 78   Ht 4' 9.8\" (1.468 m)   Wt 115 lb 4.8 oz (52.3 kg)   SpO2 100%   Breastfeeding No   BMI 24.26 kg/m    Constitutional:    --Vitals as above  --No acute distress  Eyes-  --Sclera noninjected  --Lids and conjunctiva normal  Musculoskeletal-  --Examination the right knee shows patella difficult to move due to knee being tensed, pain to palpation over the medial tibial plateau and the medial collateral ligament.  Roxana's test difficult to achieve.  Lockman's test negative.  No obvious swelling  Skin-  --Pink and dry    Knee x-ray-pending    A/P:   1. Acute pain of right knee  The patient has acute knee pain.  Cannot rule out meniscal tear.  Discussed imaging including MRI and patient will consider.  In addition, recommended nonsteroidal anti-inflammatories with food, rest, and " I gave her a note to not bear weight on that right leg for 2 weeks.  If not improving MRI will be ordered.  - naproxen (NAPROSYN) 500 MG tablet; Take 1 tablet (500 mg total) by mouth 2 (two) times a day with meals.  Dispense: 60 tablet; Refill: 2  - XR Knee Right 1 or 2 VWS; Future

## 2021-06-06 NOTE — TELEPHONE ENCOUNTER
Forms Request  Name of form/paperwork: Other:  [employer] if sending request to clinic at fax# [phone] They need dates of off work and when to return etc.  Please advise.   Have you been seen for this request: Yes:  2/10/20  Do we have the form: Yes- faxing to clinic  When is form needed by: ASAP  How would you like the form returned: Fax:  See form  Patient Notified form requests are processed in 3-5 business days: No    Okay to leave a detailed message? No [phone]   Ref#[id number]

## 2021-06-06 NOTE — TELEPHONE ENCOUNTER
Forms Request  Name of form/paperwork: Other:  LISA Form from [facility]  Have you been seen for this request: N/A  Do we have the form: Yes- Faxed today to [phone]  When is form needed by: as soon as possible   How would you like the form returned: Fax:  Fax number provided on form  Patient Notified form requests are processed in 3-5 business days: No  Okay to leave a detailed message? Yes  [phone]

## 2021-06-11 NOTE — TELEPHONE ENCOUNTER
Pt doesn't want to schedule appt for something that should have been discussed in Feb 2020. Xray report given to Dr Wagner for review. Pt notified of results. Arthritic changes on inside of right knee. History of trauma which caused the ossification.

## 2021-06-11 NOTE — TELEPHONE ENCOUNTER
Test Results  Who is calling?:  Patient  Who ordered the test:  Roge Jacobson  Type of test: Imaging  Date of test:  02/10/2020  Where was the test performed:  In clinic  What are your questions/concerns?:  Pt would like to know her results of Knee xray  Okay to leave a detailed message?:  Yes

## 2021-06-11 NOTE — TELEPHONE ENCOUNTER
Pt asking about knee xray from Feb 2020. Sister was just told that she has arthritis in her knee. Pt wants to know if she had this noted on her xray. Read patient the impression. Wants to know what it means.        Ok to lv detailed message.

## 2021-06-20 NOTE — LETTER
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  Patient was seen today for a right shoulder tendinopathy. Excuse her from work 6/6/20 through today. I anticipate that she should be able to return to work later this week if continues to improve.    If you have any questions or concerns, please don't hesitate to call.    Sincerely,        Electronically signed by Kavon Branch MD

## 2021-07-01 NOTE — PROGRESS NOTES
Chief Complaint   Patient presents with     [employer] Paperwork       HPI: Patient presents for right knee pain follow-up.  Knee is improved, there is no swelling, no locking clicking or popping noted.    ROS: No swelling noted.  No fever.    SH: Reviewed-see Snapshot for review.     FH: Reviewed-see Snapshot for review.    Meds:  Christine has a current medication list which includes the following prescription(s): estradiol, medroxyprogesterone, and naproxen.    O:  There were no vitals taken for this visit.  Constitutional:    --Vitals as above  --No acute distress  Eyes-  --Sclera noninjected  --Lids and conjunctiva normal  Musculoskeletal-  --Examination the right knee shows normal flexion extension with no obvious swelling.  Patella freely movable.  Patient ambulates freely.  Skin-  --Pink and dry    A/P:   1. Acute pain of right knee  Patient had multiple forms to fill out.  Disability forms and FMLA form completed and given to the patient.  We actually faxed the forms for her.  In addition, patient to continue over-the-counter NSAIDs and may return to work.  
Ms. Yelena Ferris is presenting to follow up     CC: Annual Wellness Visit       HPI:    Patient had shinglex recovered last year during pandemic,   Had several   covid exposures that kept patient at home intermittently   Tested negative  Antibody testing was negative also     Had a hard object fall on her right foot. And having severe pain on side of foot     Roger Burris and does yearly mammograms and pap smears up to date     Review of systems:  Constitutional: negative for fever, chills, weight loss, night sweats   Eyes : negative for vision changes, eye pain and discharge  Nose and Throat: negative for tinnitus, sore throat   Cardiovascular: negative for chest pain, palpitations and shortness of breath  Respiratory: negative for shortness of breath, cough and wheezing   Gastroinstestinal: negative for abdominal pain, nausea, vomiting, diarrhea, constipation, and blood in the stool  Musculoskeletal: see HPI  Genitourinary: negative for dysuria, nocturia, polyuria and hematuria   Neurologic: Negative for focal weakness, numbness or incoordination  Skin: negative for rash, pruritus  Hematologic: negative for easy bruising      Past Medical History:   Diagnosis Date    Anemia     Fibroid uterus         History reviewed. No pertinent surgical history. No Known Allergies    Current Outpatient Medications on File Prior to Visit   Medication Sig Dispense Refill    ondansetron (ZOFRAN ODT) 4 mg disintegrating tablet Take 1 Tab by mouth every eight (8) hours as needed for Nausea. 20 Tab 0    ferrous sulfate 325 mg (65 mg iron) tablet Take 1 Tab by mouth two (2) times a day. 90 Tab 2     No current facility-administered medications on file prior to visit. family history includes Diabetes in her mother.     Social History     Socioeconomic History    Marital status: SINGLE     Spouse name: Not on file    Number of children: Not on file    Years of education: Not on file    Highest education
level: Not on file   Occupational History    Not on file   Tobacco Use    Smoking status: Never Smoker    Smokeless tobacco: Never Used   Substance and Sexual Activity    Alcohol use: Never    Drug use: Never    Sexual activity: Not Currently   Other Topics Concern    Not on file   Social History Narrative    Not on file     Social Determinants of Health     Financial Resource Strain:     Difficulty of Paying Living Expenses:    Food Insecurity:     Worried About Running Out of Food in the Last Year:     [address] in the Last Year:    Transportation Needs:     Lack of Transportation (Medical):  Lack of Transportation (Non-Medical):    Physical Activity:     Days of Exercise per Week:     Minutes of Exercise per Session:    Stress:     Feeling of Stress :    Social Connections:     Frequency of Communication with Friends and Family:     Frequency of Social Gatherings with Friends and Family:     Attends Congregational Services:     Active Member of Clubs or Organizations:     Attends Club or Organization Meetings:     Marital Status:    Intimate Partner Violence:     Fear of Current or Ex-Partner:     Emotionally Abused:     Physically Abused:     Sexually Abused:        Visit Vitals  /80 (BP 1 Location: Right arm, BP Patient Position: Sitting, BP Cuff Size: Large adult)   Pulse 81   Temp 98.6 °F (37 °C) (Oral)   Resp 16   Ht 5' 1\" (1.549 m)   Wt 147 lb (66.7 kg)   LMP 07/01/2021   SpO2 100%   BMI 27.78 kg/m²     General:  Well appearing female no acute distress  HEENT:   PERRL,normal conjunctiva. External ear and canals normal, TMs normal.  Hearing normal to voice. Nose without edema or discharge, normal septum. Lips, teeth, gums normal.  Oropharynx: no erythema, no exudates, no lesions, normal tongue. Neck:  Supple. Thyroid normal size, nontender, without nodules. No carotid bruit.  No masses or lymphadenopathy  Respiratory: no respiratory distress,  no wheezing, no rhonchi,
no rales. No chest wall tenderness. Cardiovascular:  RRR, normal S1S2, no murmur. Gastrointestinal: normal bowel sounds, soft, nontender, without masses. No hepatosplenomegaly. Extremities +2 pulses, no edema, normal sensation   Musculoskeletal:  Normal gait. Normal digits and nails. Normal strength and tone, no atrophy, and no abnormal movement. Left foot pain with palpation of lateral dorsal distal aspect over 5th digit also  Has FROM and normal inspection   Skin:  No rash, no lesions, no ulcers. Skin warm, normal turgor, without induration or nodules. Neuro:  A and OX4, fluent speech, cranial nerves normal 2-12. Psych:  Normal affect      Lab Results   Component Value Date/Time    WBC 6.2 08/26/2020 02:19 AM    HGB 9.9 (L) 08/26/2020 02:19 AM    HCT 31.8 (L) 08/26/2020 02:19 AM    PLATELET 427 81/45/2749 02:19 AM    MCV 87.4 08/26/2020 02:19 AM     Lab Results   Component Value Date/Time    Sodium 138 08/26/2020 02:19 AM    Potassium 3.2 (L) 08/26/2020 02:19 AM    Chloride 108 08/26/2020 02:19 AM    CO2 26 08/26/2020 02:19 AM    Anion gap 4 (L) 08/26/2020 02:19 AM    Glucose 103 (H) 08/26/2020 02:19 AM    BUN 8 08/26/2020 02:19 AM    Creatinine 0.77 08/26/2020 02:19 AM    BUN/Creatinine ratio 10 (L) 08/26/2020 02:19 AM    GFR est AA >60 08/26/2020 02:19 AM    GFR est non-AA >60 08/26/2020 02:19 AM    Calcium 8.9 08/26/2020 02:19 AM     Lab Results   Component Value Date/Time    Cholesterol, total 232 (H) 12/23/2019 03:27 PM    HDL Cholesterol 83 12/23/2019 03:27 PM    LDL, calculated 140 (H) 12/23/2019 03:27 PM    VLDL, calculated 9 12/23/2019 03:27 PM    Triglyceride 46 12/23/2019 03:27 PM     No results found for: TSH, TSH2, TSH3, TSHP, TSHEXT, TSHEXT  Lab Results   Component Value Date/Time    Hemoglobin A1c 4.7 (L) 12/23/2019 03:27 PM     No results found for: Alpesh Arriaga, XQVID3, XQVID, VD3RIA                Assessment and Plan:     1. Iron deficiency anemia due to chronic blood loss    2.
Fibroids  Hx of fibroids  On iron   - CBC WITH AUTOMATED DIFF; Future  - METABOLIC PANEL, COMPREHENSIVE; Future  - METABOLIC PANEL, COMPREHENSIVE  - CBC WITH AUTOMATED DIFF    3. Screening for cholesterol level    - LIPID PANEL; Future  - LIPID PANEL    4. Encounter for hepatitis C screening test for low risk patient  - HEPATITIS C AB; Future  - HEPATITIS C AB    5. Screening for colorectal cancer  - REFERRAL FOR COLONOSCOPY    6. Left foot pain  - XR FOOT LT MIN 3 V; Future    7. Encounter for immunization  - TETANUS, DIPHTHERIA TOXOIDS AND ACELLULAR PERTUSSIS VACCINE (TDAP), IN INDIVIDS. >=7, IM      Follow-up and Dispositions    · Return in about 6 months (around 1/1/2022).           Carl Saeed MD
Oriented - self; Oriented - place; Oriented - time

## 2021-09-02 DIAGNOSIS — N95.1 SYMPTOMATIC MENOPAUSAL OR FEMALE CLIMACTERIC STATES: ICD-10-CM

## 2021-09-02 RX ORDER — ESTRADIOL 1 MG/1
1 TABLET ORAL DAILY
Qty: 90 TABLET | Refills: 0 | Status: SHIPPED | OUTPATIENT
Start: 2021-09-02 | End: 2023-01-13

## 2021-09-02 RX ORDER — MEDROXYPROGESTERONE ACETATE 2.5 MG/1
2.5 TABLET ORAL DAILY
Qty: 90 TABLET | Refills: 0 | Status: SHIPPED | OUTPATIENT
Start: 2021-09-02 | End: 2023-11-10

## 2021-09-02 NOTE — TELEPHONE ENCOUNTER
Pending Prescriptions:                       Disp   Refills    medroxyPROGESTERone (PROVERA) 2.5 MG tabl*90 tab*0            Sig: Take 1 tablet (2.5 mg) by mouth daily    estradiol (ESTRACE) 1 MG tablet           90 tab*0            Sig: Take 1 tablet (1 mg) by mouth daily    Pt calling for refills. Last OV was 7/10/2020. Due for AE. Advised to schedule appt. Once appt scheduled a refill can be sent to bridge until appt.   Salina Sun, RN-BSN

## 2021-10-23 ENCOUNTER — HEALTH MAINTENANCE LETTER (OUTPATIENT)
Age: 61
End: 2021-10-23

## 2021-11-05 ENCOUNTER — OFFICE VISIT (OUTPATIENT)
Dept: OBGYN | Facility: CLINIC | Age: 61
End: 2021-11-05
Payer: COMMERCIAL

## 2021-11-05 VITALS
HEART RATE: 84 BPM | SYSTOLIC BLOOD PRESSURE: 112 MMHG | OXYGEN SATURATION: 100 % | DIASTOLIC BLOOD PRESSURE: 78 MMHG | WEIGHT: 113 LBS | BODY MASS INDEX: 23.02 KG/M2

## 2021-11-05 DIAGNOSIS — N95.1 SYMPTOMATIC MENOPAUSAL OR FEMALE CLIMACTERIC STATES: ICD-10-CM

## 2021-11-05 DIAGNOSIS — Z00.00 ANNUAL PHYSICAL EXAM: Primary | ICD-10-CM

## 2021-11-05 LAB
CHOLEST SERPL-MCNC: 212 MG/DL
FASTING STATUS PATIENT QL REPORTED: YES
HBA1C MFR BLD: 5.4 % (ref 0–5.6)
HDLC SERPL-MCNC: 91 MG/DL
LDLC SERPL CALC-MCNC: 105 MG/DL
NONHDLC SERPL-MCNC: 121 MG/DL
TRIGL SERPL-MCNC: 80 MG/DL
VIT B12 SERPL-MCNC: 312 PG/ML (ref 193–986)

## 2021-11-05 PROCEDURE — 99213 OFFICE O/P EST LOW 20 MIN: CPT | Mod: 25 | Performed by: OBSTETRICS & GYNECOLOGY

## 2021-11-05 PROCEDURE — 99396 PREV VISIT EST AGE 40-64: CPT | Performed by: OBSTETRICS & GYNECOLOGY

## 2021-11-05 PROCEDURE — 36415 COLL VENOUS BLD VENIPUNCTURE: CPT | Performed by: OBSTETRICS & GYNECOLOGY

## 2021-11-05 PROCEDURE — 82607 VITAMIN B-12: CPT | Performed by: OBSTETRICS & GYNECOLOGY

## 2021-11-05 PROCEDURE — 83036 HEMOGLOBIN GLYCOSYLATED A1C: CPT | Performed by: OBSTETRICS & GYNECOLOGY

## 2021-11-05 PROCEDURE — 80061 LIPID PANEL: CPT | Performed by: OBSTETRICS & GYNECOLOGY

## 2021-11-05 RX ORDER — MEDROXYPROGESTERONE ACETATE 2.5 MG/1
2.5 TABLET ORAL DAILY
Qty: 90 TABLET | Refills: 4 | Status: SHIPPED | OUTPATIENT
Start: 2021-11-05 | End: 2022-12-22

## 2021-11-05 RX ORDER — ESTRADIOL 0.5 MG/1
1 TABLET ORAL DAILY
Qty: 30 TABLET | Refills: 3 | Status: SHIPPED | OUTPATIENT
Start: 2021-11-05

## 2021-11-05 NOTE — PROGRESS NOTES
Christine is a 61 year old  who presents for annual exam.   Postmenopausal since .  She is having hot flashes, severe. No vaginal bleeding noted.     Besides routine health maintenance, she has no other health concerns today .  Would like B12 checked again this year, as it was checked last year and found to be low.  She has been taking supplement.  Has continued on hormone replacement therapy.  Did try to stop it briefly but had debilitating hot flashes.  Would like to continue.  Also wondering about indications for DEXA scan, as well as other routine lab work.  Would like to be checked for diabetes and would like lipids checked as well.  GYNECOLOGIC HISTORY:  Menarche: 13   Age at first intercourse: 20 Number of lifetime partners: more than 6  She is not sexually active with 0male partner(s) and she is not currently in monogamous relationship with 0.    History sexually transmitted infections:Chlamydia, Gonorrhea, Herpes, Genital warts and HPV  STI testing offered?  Declined  Estrogen replacement therapy: Yes -  Oral  ADILENE exposure: No    History of abnormal Pap smear: YES - updated in Problem List and Health Maintenance accordingly  Family history of breast CA: No  Family history of uterine/ovarian CA: No  Family history of colon CA: No    HEALTH MAINTENANCE:  Cholesterol: (No components found for: CHOL2 ) History of abnormal lipids: No  Mammo: 23 . History of abnormal Mammo: No  Regular Self Breast Exams: Yes  Colonoscopy:  History of abnormal Colonoscopy: No  Dexa:  History of abnormal Dexa: No  Calcium/Vitamin D intake: source:  dairy, dietary supplement(s) Adequate? Yes  TSH: (No components found for: TSH1 )  Pap; (  Lab Results   Component Value Date    PAP NIL 07/10/2020    PAP NIL 2019    PAP NIL 2018    )    HISTORY:  OB History    Para Term  AB Living   5 4 4 0 1 4   SAB TAB Ectopic Multiple Live Births   1 0 0 0 4      # Outcome Date GA Lbr [place]/2nd Weight Sex  Delivery Anes PTL Lv   5 Term 07/15/91 40w0d       MEENA   4 Term 08/10/90 40w0d    CS   MEENA   3 Term 84 40w0d       MEENA   2 Term 10/02/82 40w0d       MEENA   1 SAB 80     SAB   DEC     Past Medical History:   Diagnosis Date     ASCUS on Pap smear     + HPV , colp WNL     ASCUS with positive high risk HPV cervical 2007    Followed up with a colp (pulled from [facility])     Cervical high risk human papillomavirus (HPV) DNA test positive , 20    Unable to type. 18: See problem list.      Herpes simplex without mention of complication     no recent outbreaks     Other and unspecified hyperlipidemia     elevated chol, ratio WNL     Papanicolaou smear of cervix with atypical squamous cells of undetermined significance (ASC-US)     +Endometrial cells  and 2002 US neg     Varicella without mention of complication     Chickenpox     Past Surgical History:   Procedure Laterality Date     ABDOMINOPLASTY        SECTION       COLONOSCOPY  14    negative, repeat in 10 years     COLONOSCOPY  2012    [place] - Repeat in 10 years.      hernia  age 3 or 4    hernia repair     HERNIA REPAIR      Between the age of 3 and 4 years. (Pulled from [facility] record)      MAMMOPLASTY AUGMENTATION       MAMMOPLASTY AUGMENTATION BILATERAL       VAGINAL DELIVERY      x's 3      ZZ NONSPECIFIC PROCEDURE  ,,91    Vaginal delivery x3     ZZ NONSPECIFIC PROCEDURE           ZZ NONSPECIFIC PROCEDURE  83    SAB     Family History   Problem Relation Age of Onset     Respiratory Mother         emphysema     Eye Disorder Mother         glaucoma     Psychotic Disorder Mother         depression     Depression Mother      Cancer Mother      Hypertension Father      Cancer Father         pancreatic CA-  72     Other - See Comments Sister         fibromyalgia     Diabetes Maternal Grandfather      Gastrointestinal Disease Daughter         ulcers      Breast Cancer Maternal Aunt         Unsure what age or outcome     Alcohol/Drug Brother         Alcohol     Liver Disease Brother      Depression Daughter      Other - See Comments Other         similar to MS     Emphysema Mother      Glaucoma Mother      Pancreatic Cancer Father 72.00     Fibromyalgia Sister      Alcoholism Brother      Liver Disease Brother      Social History     Socioeconomic History     Marital status:      Spouse name: None     Number of children: 4     Years of education: 12     Highest education level: None   Occupational History     None   Tobacco Use     Smoking status: Never Smoker     Smokeless tobacco: Never Used   Substance and Sexual Activity     Alcohol use: Yes     Comment: 1-2 per month     Drug use: No     Sexual activity: Yes     Partners: Male     Birth control/protection: Surgical, Post-menopausal     Comment: vasectomy   Other Topics Concern      Service No     Blood Transfusions No     Caffeine Concern No     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 No   Social History Narrative    Caffeine intake/servings daily - 0-1 cup of coffee    Calcium intake/servings daily - 0    Exercise 0 times weekly - describe weights, going to start again    Sunscreen used - No    Seatbelts used - Yes    Guns stored in the home - No    Self Breast Exam - No    Pap test up to date -  Yes, as of today    Eye exam up to date -  No    Dental exam up to date -  Yes    DEXA scan up to date -  Not Applicable    Flex Sig/Colonoscopy up to date -  Not Applicable    Mammography up to date -  No    Immunizations reviewed and up to date - Unsure    Abuse: Current  or Past (Physical, Sexual or Emotional) - No    Do you feel safe in your environment - Yes    Do you cope well with stress - Yes    Do you suffer from insomnia - No    Last updated by: Natali Osborne MA 9/16/11                         Social Determinants of Health     Financial Resource Strain:      Difficulty of Paying Living Expenses:    Food Insecurity:      Worried About Running Out of Food in the Last Year:      Ran Out of Food in the Last Year:    Transportation Needs:      Lack of Transportation (Medical):      Lack of Transportation (Non-Medical):    Physical Activity:      Days of Exercise per Week:      Minutes of Exercise per Session:    Stress:      Feeling of Stress :    Social Connections:      Frequency of Communication with Friends and Family:      Frequency of Social Gatherings with Friends and Family:      Attends Restoration Services:      Active Member of Clubs or Organizations:      Attends Club or Organization Meetings:      Marital Status:    Intimate Partner Violence:      Fear of Current or Ex-Partner:      Emotionally Abused:      Physically Abused:      Sexually Abused:        Current Outpatient Medications:      estradiol (ESTRACE) 0.5 MG tablet, Take 2 tablets (1 mg) by mouth daily, Disp: 30 tablet, Rfl: 3     estradiol (ESTRACE) 1 MG tablet, Take 1 tablet (1 mg) by mouth daily, Disp: 90 tablet, Rfl: 0     medroxyPROGESTERone (PROVERA) 2.5 MG tablet, Take 1 tablet (2.5 mg) by mouth daily, Disp: 90 tablet, Rfl: 4     medroxyPROGESTERone (PROVERA) 2.5 MG tablet, Take 1 tablet (2.5 mg) by mouth daily, Disp: 90 tablet, Rfl: 0     Probiotic Product (PROBIOTIC PO), Take  by mouth., Disp: , Rfl:      Allergies   Allergen Reactions     No Known Drug Allergies        Past medical, surgical, social and family history were reviewed and updated in EPIC.    ROS:   C:       NEGATIVE for fever, chills, change in weight  I:         NEGATIVE for worrisome rashes, moles or lesions  E:       NEGATIVE for vision  changes or irritation  E/M:   NEGATIVE for ear, mouth and throat problems  R:       NEGATIVE for significant cough or SOB  CV:     NEGATIVE for chest pain, palpitations or peripheral edema  GI:      NEGATIVE for nausea, abdominal pain, heartburn, or change in bowel habits  :    NEGATIVE for frequency, dysuria, hematuria, vaginal discharge, or bleeding  M:       NEGATIVE for significant arthralgias or myalgia  N:       NEGATIVE for weakness, dizziness or paresthesias  E:       NEGATIVE for temperature intolerance, skin/hair changes  P:       NEGATIVE for changes in mood or affect    EXAM:  /78   Pulse 84   Wt 51.3 kg (113 lb)   LMP 12/01/2011   SpO2 100%   Breastfeeding No   BMI 23.02 kg/m     BMI: Body mass index is 23.02 kg/m .  Constitutional: healthy, alert and no distress  Head: Normocephalic. No masses, lesions, tenderness or abnormalities  Neck: Neck supple. Trachea midline. No adenopathy. Thyroid symmetric, normal size.   Cardiovascular: RRR.   Respiratory: Negative.   Breast:  Bilateral breast implants No nodularity, asymmetry or nipple discharge bilaterally.  Gastrointestinal: Abdomen soft, non-tender, non-distended. No masses, organomegaly  :  Vulva:  No external lesions, normal female hair distribution, no inguinal adenopathy.    Urethra:  Midline, non-tender, well supported, no discharge  Vagina:  no abnormal discharge, no lesions  Uterus:  Normal size, non-tender, freely mobile  Ovaries:  No masses appreciated, non-tender, mobile  Rectal Exam: deferred  Musculoskeletal: extremities normal  Skin: no suspicious lesions or rashes  Psychiatric: Affect appropriate, cooperative,mentation appears normal.     COUNSELING:   Reviewed preventive health counseling, as reflected in patient instructions       Osteoporosis prevention/bone health       Colon cancer screening       (Valerie)menopause management   reports that she has never smoked. She has never used smokeless tobacco.    Body mass index is  23.02 kg/m .      FRAX Risk Assessment  ASSESSMENT:  61 year old  with satisfactory annual exam  (Z00.00) Annual physical exam  (primary encounter diagnosis)  Comment: Reviewed labs and placed new orders  Plan: Vitamin B12, Hemoglobin A1c, Lipid Profile         (Chol, Trig, HDL, LDL calc)            (N95.1) Symptomatic menopausal or female climacteric states  Comment: Patient has continued on hormone replacement due to significant vasomotor symptoms.  We again reviewed the risks of hormone replacement therapy, especially when continuing longer term.  She is accepting of these risks and wants to continue.  We did discuss trying a lower dose to see if that would be adequate to manage her symptoms and she was open to this.  Estradiol prescription was decreased from 1 mg daily to 1/2 mg daily.  1 month supply was provided.  She will then let me know if this dose is adequate and I will give more refills versus going back to 1 mg daily dose.  Plan: estradiol (ESTRACE) 0.5 MG tablet,         medroxyPROGESTERone (PROVERA) 2.5 MG tablet            Zehra Armenta MD

## 2022-01-06 DIAGNOSIS — N95.1 SYMPTOMATIC MENOPAUSAL OR FEMALE CLIMACTERIC STATES: Primary | ICD-10-CM

## 2022-01-06 RX ORDER — ESTRADIOL 0.5 MG/1
0.5 TABLET ORAL DAILY
Qty: 90 TABLET | Refills: 3 | Status: SHIPPED | OUTPATIENT
Start: 2022-01-06

## 2022-01-06 NOTE — TELEPHONE ENCOUNTER
"Patient TC to clinic to request refill of estradiol. She  Would like to be able to  more that a month's worth of medication at a time. Per provider note on 11/5, one-month supply was prescribed to trial lower dose. Per provider, \"She will then let me know if this dose is adequate and I will give more refills versus going back to 1 mg daily dose.\" Patient states current dose of 0.5mg daily is adequate. Prescription started, routed to on-call provider for approval.   Iveth Pacheco RN     "

## 2022-06-04 ENCOUNTER — HEALTH MAINTENANCE LETTER (OUTPATIENT)
Age: 62
End: 2022-06-04

## 2022-07-22 ENCOUNTER — TELEPHONE (OUTPATIENT)
Dept: OBGYN | Facility: CLINIC | Age: 62
End: 2022-07-22

## 2022-07-22 DIAGNOSIS — M25.552 HIP PAIN, BILATERAL: Primary | ICD-10-CM

## 2022-07-22 DIAGNOSIS — M25.551 HIP PAIN, BILATERAL: Primary | ICD-10-CM

## 2022-07-22 NOTE — TELEPHONE ENCOUNTER
Pt called stating that she needs an x-ray ordered for her hips. This was recommended by the pt's chiropractor but her insurance will not cover it unless ordered by a physician. Since the pt does not visit a general physician she was wondering if one of our providers would be able to order it for her. Pt will be faxing over information from said chiropractor.

## 2022-07-26 ENCOUNTER — ANCILLARY PROCEDURE (OUTPATIENT)
Dept: GENERAL RADIOLOGY | Facility: CLINIC | Age: 62
End: 2022-07-26
Attending: OBSTETRICS & GYNECOLOGY
Payer: COMMERCIAL

## 2022-07-26 DIAGNOSIS — M25.552 HIP PAIN, BILATERAL: ICD-10-CM

## 2022-07-26 DIAGNOSIS — M25.551 HIP PAIN, BILATERAL: ICD-10-CM

## 2022-07-26 PROCEDURE — 73522 X-RAY EXAM HIPS BI 3-4 VIEWS: CPT | Mod: TC | Performed by: RADIOLOGY

## 2022-10-09 ENCOUNTER — HEALTH MAINTENANCE LETTER (OUTPATIENT)
Age: 62
End: 2022-10-09

## 2022-10-12 ENCOUNTER — HOSPITAL ENCOUNTER (OUTPATIENT)
Dept: MAMMOGRAPHY | Facility: CLINIC | Age: 62
Discharge: HOME | End: 2022-10-12
Attending: PREVENTIVE MEDICINE | Admitting: PREVENTIVE MEDICINE
Payer: COMMERCIAL

## 2022-10-12 DIAGNOSIS — Z12.31 OTHER SCREENING MAMMOGRAM: ICD-10-CM

## 2022-10-12 PROCEDURE — 77063 BREAST TOMOSYNTHESIS BI: CPT

## 2022-12-20 ENCOUNTER — TELEPHONE (OUTPATIENT)
Dept: OBGYN | Facility: CLINIC | Age: 62
End: 2022-12-20

## 2022-12-22 DIAGNOSIS — N95.1 SYMPTOMATIC MENOPAUSAL OR FEMALE CLIMACTERIC STATES: ICD-10-CM

## 2022-12-22 RX ORDER — MEDROXYPROGESTERONE ACETATE 2.5 MG/1
2.5 TABLET ORAL DAILY
Qty: 90 TABLET | Refills: 1 | Status: SHIPPED | OUTPATIENT
Start: 2022-12-22 | End: 2023-01-13

## 2022-12-22 NOTE — TELEPHONE ENCOUNTER
Requested Prescriptions   Pending Prescriptions Disp Refills     medroxyPROGESTERone (PROVERA) 2.5 MG tablet 90 tablet 4     Sig: Take 1 tablet (2.5 mg) by mouth daily       There is no refill protocol information for this order          [employer] message sent to pt.  Last in clinic 11/2021. Routed to the provider.

## 2022-12-26 ENCOUNTER — TELEPHONE (OUTPATIENT)
Dept: OBGYN | Facility: CLINIC | Age: 62
End: 2022-12-26

## 2022-12-26 NOTE — TELEPHONE ENCOUNTER
Pt has called and scheduled an appt for med check for 2/3. She is wondering if she can get a prescription up until then. Please call to advise.

## 2023-01-13 ENCOUNTER — OFFICE VISIT (OUTPATIENT)
Dept: FAMILY MEDICINE | Facility: CLINIC | Age: 63
End: 2023-01-13
Payer: COMMERCIAL

## 2023-01-13 VITALS
WEIGHT: 113 LBS | DIASTOLIC BLOOD PRESSURE: 74 MMHG | TEMPERATURE: 98.3 F | OXYGEN SATURATION: 100 % | SYSTOLIC BLOOD PRESSURE: 119 MMHG | BODY MASS INDEX: 22.78 KG/M2 | HEIGHT: 59 IN | HEART RATE: 68 BPM

## 2023-01-13 DIAGNOSIS — Z00.00 ROUTINE GENERAL MEDICAL EXAMINATION AT A HEALTH CARE FACILITY: Primary | ICD-10-CM

## 2023-01-13 DIAGNOSIS — Z12.4 SCREENING FOR CERVICAL CANCER: ICD-10-CM

## 2023-01-13 DIAGNOSIS — Z12.11 SCREEN FOR COLON CANCER: ICD-10-CM

## 2023-01-13 DIAGNOSIS — N95.1 SYMPTOMATIC MENOPAUSAL OR FEMALE CLIMACTERIC STATES: ICD-10-CM

## 2023-01-13 DIAGNOSIS — Z12.31 ENCOUNTER FOR SCREENING MAMMOGRAM FOR BREAST CANCER: ICD-10-CM

## 2023-01-13 LAB
ALBUMIN SERPL-MCNC: 4.3 G/DL (ref 3.4–5)
ALP SERPL-CCNC: 64 U/L (ref 40–150)
ALT SERPL W P-5'-P-CCNC: 21 U/L (ref 0–50)
ANION GAP SERPL CALCULATED.3IONS-SCNC: 8 MMOL/L (ref 3–14)
AST SERPL W P-5'-P-CCNC: 14 U/L (ref 0–45)
BILIRUB SERPL-MCNC: 0.6 MG/DL (ref 0.2–1.3)
BUN SERPL-MCNC: 12 MG/DL (ref 7–30)
CALCIUM SERPL-MCNC: 9.6 MG/DL (ref 8.5–10.1)
CHLORIDE BLD-SCNC: 107 MMOL/L (ref 94–109)
CHOLEST SERPL-MCNC: 188 MG/DL
CO2 SERPL-SCNC: 27 MMOL/L (ref 20–32)
CREAT SERPL-MCNC: 0.72 MG/DL (ref 0.52–1.04)
DEPRECATED CALCIDIOL+CALCIFEROL SERPL-MC: 33 UG/L (ref 20–75)
ERYTHROCYTE [DISTWIDTH] IN BLOOD BY AUTOMATED COUNT: 12.9 % (ref 10–15)
FASTING STATUS PATIENT QL REPORTED: YES
GFR SERPL CREATININE-BSD FRML MDRD: >90 ML/MIN/1.73M2
GLUCOSE BLD-MCNC: 100 MG/DL (ref 70–99)
HCT VFR BLD AUTO: 42.7 % (ref 35–47)
HDLC SERPL-MCNC: 117 MG/DL
HGB BLD-MCNC: 14.3 G/DL (ref 11.7–15.7)
LDLC SERPL CALC-MCNC: 62 MG/DL
MCH RBC QN AUTO: 30 PG (ref 26.5–33)
MCHC RBC AUTO-ENTMCNC: 33.5 G/DL (ref 31.5–36.5)
MCV RBC AUTO: 90 FL (ref 78–100)
NONHDLC SERPL-MCNC: 71 MG/DL
PLATELET # BLD AUTO: 217 10E3/UL (ref 150–450)
POTASSIUM BLD-SCNC: 4.7 MMOL/L (ref 3.4–5.3)
PROT SERPL-MCNC: 7.7 G/DL (ref 6.8–8.8)
RBC # BLD AUTO: 4.77 10E6/UL (ref 3.8–5.2)
SODIUM SERPL-SCNC: 142 MMOL/L (ref 133–144)
TRIGL SERPL-MCNC: 43 MG/DL
WBC # BLD AUTO: 5.3 10E3/UL (ref 4–11)

## 2023-01-13 PROCEDURE — 36415 COLL VENOUS BLD VENIPUNCTURE: CPT | Performed by: FAMILY MEDICINE

## 2023-01-13 PROCEDURE — 80053 COMPREHEN METABOLIC PANEL: CPT | Performed by: FAMILY MEDICINE

## 2023-01-13 PROCEDURE — 85027 COMPLETE CBC AUTOMATED: CPT | Performed by: FAMILY MEDICINE

## 2023-01-13 PROCEDURE — 80061 LIPID PANEL: CPT | Performed by: FAMILY MEDICINE

## 2023-01-13 PROCEDURE — 99396 PREV VISIT EST AGE 40-64: CPT | Performed by: FAMILY MEDICINE

## 2023-01-13 PROCEDURE — 99213 OFFICE O/P EST LOW 20 MIN: CPT | Mod: 25 | Performed by: FAMILY MEDICINE

## 2023-01-13 PROCEDURE — 82306 VITAMIN D 25 HYDROXY: CPT | Performed by: FAMILY MEDICINE

## 2023-01-13 RX ORDER — ESTRADIOL 1 MG/1
1 TABLET ORAL DAILY
Qty: 90 TABLET | Refills: 0 | Status: SHIPPED | OUTPATIENT
Start: 2023-01-13 | End: 2023-04-20

## 2023-01-13 RX ORDER — MEDROXYPROGESTERONE ACETATE 2.5 MG/1
2.5 TABLET ORAL DAILY
Qty: 90 TABLET | Refills: 1 | Status: SHIPPED | OUTPATIENT
Start: 2023-01-13 | End: 2023-10-10

## 2023-01-13 ASSESSMENT — ENCOUNTER SYMPTOMS
DYSURIA: 0
HEARTBURN: 0
SHORTNESS OF BREATH: 0
DIZZINESS: 0
CONSTIPATION: 0
HEMATOCHEZIA: 0
EYE PAIN: 0
NAUSEA: 0
ARTHRALGIAS: 1
PARESTHESIAS: 0
PALPITATIONS: 0
ABDOMINAL PAIN: 0
FREQUENCY: 0
DIARRHEA: 0
SORE THROAT: 0
HEADACHES: 0
COUGH: 0
WEAKNESS: 0
CHILLS: 0
JOINT SWELLING: 0
FEVER: 0
NERVOUS/ANXIOUS: 0
BREAST MASS: 0
MYALGIAS: 0
HEMATURIA: 0

## 2023-01-13 ASSESSMENT — PAIN SCALES - GENERAL: PAINLEVEL: NO PAIN (0)

## 2023-01-13 NOTE — PATIENT INSTRUCTIONS
At [facility], we strive to deliver an exceptional experience to you, every time we see you. If you receive a survey, please complete it as we do value your feedback.  If you have MyChart, you can expect to receive results automatically within 24 hours of their completion.  Your provider will send a note interpreting your results as well.   If you do not have MyChart, you should receive your results in about a week by mail.    Your care team:                            Family Medicine Internal Medicine   MD Florentino Daugherty MD Shantel Branch-Fleming, MD Srinivasa Vaka, MD Katya Belousova, PAESME Knapp CNP, MD ([facility]   Jonel Rubalcava, MD Abbey Ibarra MD Amelia Massimini APRN KATHARINE Lovell APRN MD Viktor Coker MD          Clinic hours: Monday - Thursday 7 am-6 pm; Fridays 7 am-5 pm.   Urgent care: Monday - Friday 10 am- 8 pm; Saturday and Sunday 9 am-5 pm.    Clinic: [phone]       [facility]: Monday - Thursday 8 am - 7 pm; Friday 8 am - 6 pm  [facility]: [phone]

## 2023-01-13 NOTE — PROGRESS NOTES
SUBJECTIVE:   CC: Christine is an 62 year old who presents for preventive health visit.     Patient has been advised of split billing requirements and indicates understanding: Yes  Healthy Habits:     Getting at least 3 servings of Calcium per day:  NO    Bi-annual eye exam:  NO    Dental care twice a year:  Yes    Sleep apnea or symptoms of sleep apnea:  Daytime drowsiness    Diet:  Regular (no restrictions)    Frequency of exercise:  None    Taking medications regularly:  Yes    Medication side effects:  Not applicable    PHQ-2 Total Score: 0    Additional concerns today:  No        Today's PHQ-2 Score:   PHQ-2 ( 1999 Pfizer) 9/4/2020   Q1: Little interest or pleasure in doing things 0   Q2: Feeling down, depressed or hopeless 0   PHQ-2 Score 0   PHQ-2 Total Score (12-17 Years)- Positive if 3 or more points; Administer PHQ-A if positive 0           Social History     Tobacco Use     Smoking status: Never     Smokeless tobacco: Never   Substance Use Topics     Alcohol use: Yes     Comment: 1-2 per month     If you drink alcohol do you typically have >3 drinks per day or >7 drinks per week? No    No flowsheet data found.No flowsheet data found.    Reviewed orders with patient.  Reviewed health maintenance and updated orders accordingly - Yes  Labs reviewed in EPIC    Breast Cancer Screening:  Any new diagnosis of family breast, ovarian, or bowel cancer? No    FHS-7:   Breast CA Risk Assessment (FHS-7) 10/12/2022   Did any of your first-degree relatives have breast or ovarian cancer? No   Did any of your relatives have bilateral breast cancer? No   Did any man in your family have breast cancer? No   Did any woman in your family have breast and ovarian cancer? No   Did any woman in your family have breast cancer before age 50 y? No   Do you have 2 or more relatives with breast and/or ovarian cancer? No   Do you have 2 or more relatives with breast and/or bowel cancer? No         Pertinent mammograms are reviewed under  the imaging tab.    History of abnormal Pap smear:   Last 3 Pap and HPV Results:   PAP / HPV Latest Ref Rng & Units 7/10/2020 6/28/2019 6/22/2018   PAP (Historical) - NIL NIL NIL   HPV16 NEG:Negative Negative Negative Negative   HPV18 NEG:Negative Negative Negative Negative   HPV - - See Scanned Report(A) -   HRHPV NEG:Negative Negative Positive(A) Positive(A)     PAP / HPV Latest Ref Rng & Units 7/10/2020 6/28/2019 6/22/2018   PAP (Historical) - NIL NIL NIL   HPV16 NEG:Negative Negative Negative Negative   HPV18 NEG:Negative Negative Negative Negative   HPV - - See Scanned Report(A) -   HRHPV NEG:Negative Negative Positive(A) Positive(A)     Reviewed and updated as needed this visit by clinical staff   Tobacco   Meds              Reviewed and updated as needed this visit by Provider                     Review of Systems   Constitutional: Negative for chills and fever.   HENT: Negative for congestion, ear pain, hearing loss and sore throat.    Eyes: Negative for pain and visual disturbance.   Respiratory: Negative for cough and shortness of breath.    Cardiovascular: Negative for chest pain, palpitations and peripheral edema.   Gastrointestinal: Negative for abdominal pain, constipation, diarrhea, heartburn, hematochezia and nausea.   Breasts:  Negative for tenderness, breast mass and discharge.   Genitourinary: Negative for dysuria, frequency, genital sores, hematuria, pelvic pain, urgency, vaginal bleeding and vaginal discharge.   Musculoskeletal: Positive for arthralgias. Negative for joint swelling and myalgias.   Skin: Negative for rash.   Neurological: Negative for dizziness, weakness, headaches and paresthesias.   Psychiatric/Behavioral: Negative for mood changes. The patient is not nervous/anxious.           OBJECTIVE:   LMP 12/01/2011   Physical Exam  GENERAL: healthy, alert and no distress  NECK: no adenopathy, no asymmetry, masses, or scars and thyroid normal to palpation  RESP: lungs clear to  auscultation - no rales, rhonchi or wheezes  CV: regular rate and rhythm, normal S1 S2, no S3 or S4, no murmur, click or rub, no peripheral edema and peripheral pulses strong  ABDOMEN: soft, nontender, no hepatosplenomegaly, no masses and bowel sounds normal  MS: no gross musculoskeletal defects noted, no edema    Diagnostic Test Results:  Labs reviewed in Epic    ASSESSMENT/PLAN:   (Z00.00) Routine general medical examination at a health care facility  (primary encounter diagnosis)  -Vital stable, depression screening normal.  -Denied shingles vaccine this visit.  Plan: CBC with platelets, Lipid panel reflex to         direct LDL Fasting, Comprehensive metabolic         panel (BMP + Alb, Alk Phos, ALT, AST, Total.         Bili, TP), Vitamin D Deficiency      (N95.1) Symptomatic menopausal or female climacteric states  -Following with Gyn.  On estradiol 0.5 mg.  She was on 1 mg in the past and try to cut down to 0.5.  -Christine is having worse hot flash symptoms and wanted to go back on 1 mg dose.  -She is aware of risk factors involved with hormonal contraceptives postmenopausal.  -Prescribing estradiol 1 mg with Provera 2.5.    Plan: estradiol (ESTRACE) 1 MG tablet,         medroxyPROGESTERone (PROVERA) 2.5 MG tablet      (Z12.11) Screen for colon cancer  -Had normal colonoscopy in 2020.  Preferred FIT test this time.  Plan: Fecal colorectal cancer screen (FIT)      (Z12.4) Screening for cervical cancer  -Normal Pap with negative HPV done in July 2020  -She has had abnormal Paps in the past.  Following with gynecology.  -Christine will schedule an appointment with gynecology around July this year to get Pap done.    (Z12.31) Encounter for screening mammogram for breast cancer  -Had breast augmentation procedure.    Plan: MA Screen with Implants Bilateral w/Brayden           Patient has been advised of split billing requirements and indicates understanding: Yes      COUNSELING:  Reviewed preventive health counseling, as  reflected in patient instructions       Regular exercise       Healthy diet/nutrition        She reports that she has never smoked. She has never used smokeless tobacco.          Viktor Rucker MD  [facility] [place]

## 2023-01-13 NOTE — PROGRESS NOTES
"SUBJECTIVE:   Christine is a 62 year old who presents for Preventive Visit.  {Split Bill scripting  The purpose of this visit is to discuss your medical history and prevent health problems before you are sick. You may be responsible for a co-pay, coinsurance, or deductible if your visit today includes services such as checking on a sore throat, having an x-ray or lab test, or treating and evaluating a new or existing condition :600152}  Patient has been advised of split billing requirements and indicates understanding: Yes  Are you in the first 12 months of your Medicare coverage?  No    Healthy Habits:     Getting at least 3 servings of Calcium per day:  NO    Bi-annual eye exam:  NO    Dental care twice a year:  Yes    Sleep apnea or symptoms of sleep apnea:  Daytime drowsiness    Diet:  Regular (no restrictions)    Frequency of exercise:  None    Taking medications regularly:  Yes    Medication side effects:  Not applicable    PHQ-2 Total Score: 0    Additional concerns today:  No      Have you ever done Advance Care Planning? (For example, a Health Directive, POLST, or a discussion with a medical provider or your loved ones about your wishes): No, advance care planning information given to patient to review.  Patient declined advance care planning discussion at this time.    {Hearing Test Done (Optional):284998}   Fall risk  { :212278}  {If any of the above assessments are answered yes, consider ordering appropriate referrals (Optional):066055::\"click delete button to remove this line now\"}  Cognitive Screening { :631204}    {Do you have sleep apnea, excessive snoring or daytime drowsiness? (Optional):461315}    Reviewed and updated as needed this visit by clinical staff   Tobacco  Allergies  Meds  Problems  Med Hx  Surg Hx  Fam Hx          Reviewed and updated as needed this visit by Provider   Tobacco  Allergies  Meds  Problems  Med Hx  Surg Hx  Fam Hx         Social History     Tobacco Use     " Smoking status: Never     Smokeless tobacco: Never   Substance Use Topics     Alcohol use: Yes     Comment: 1-2 per month     {Rooming Staff- Complete this question if Prescreen response is not shown below for today's visit. If you drink alcohol do you typically have >3 drinks per day or >7 drinks per week? (Optional):578910}    Alcohol Use 1/13/2023   Prescreen: >3 drinks/day or >7 drinks/week? No   Prescreen: >3 drinks/day or >7 drinks/week? -   {add AUDIT responses (Optional) (A score of 7 for adult men is an indication of hazardous drinking; a score of 8 or more is an indication of an alcohol use disorder.  A score of 7 or more for adult women is an indication of hazardous drinking or an alchohol use disorder):620577}    {Outside tests to abstract? :525643}    {additional problems to add (Optional):511843}    Current providers sharing in care for this patient include: {Rooming staff:  Please update Care Team from storyboard, refresh this note and then delete this statement}  Patient Care Team:  Nella Taylor MD as PCP - General (Family Practice)  Dee Dee Diaz MD as MD (Family Practice)  Zehra Crowell MD as MD (OB/Gyn)  Zehra Armenta MD as Assigned OBGYN Provider  Roge Jacobson MD as Assigned PCP    The following health maintenance items are reviewed in Epic and correct as of today:  Health Maintenance   Topic Date Due     ZOSTER IMMUNIZATION (1 of 2) Never done     COLORECTAL CANCER SCREENING  05/18/2022     DTAP/TDAP/TD IMMUNIZATION (2 - Td or Tdap) 05/17/2023     PAP FOLLOW-UP  07/10/2023     HPV FOLLOW-UP  07/10/2023     MAMMO SCREENING  10/12/2023     YEARLY PREVENTIVE VISIT  01/13/2024     ANNUAL REVIEW OF HM ORDERS  01/13/2024     LIPID  11/05/2026     ADVANCE CARE PLANNING  01/13/2028     HEPATITIS C SCREENING  Completed     HIV SCREENING  Completed     PHQ-2 (once per calendar year)  Completed     INFLUENZA VACCINE  Completed     COVID-19 Vaccine  Completed      "Pneumococcal Vaccine: Pediatrics (0 to 5 Years) and At-Risk Patients (6 to 64 Years)  Aged Out     IPV IMMUNIZATION  Aged Out     MENINGITIS IMMUNIZATION  Aged Out     PAP  Discontinued     {Chronicprobdata (optional):176000}  {Decision Support (Optional):877414}  Any new diagnosis of family breast, ovarian, or bowel cancer? {Yes_Link to Screening / No:965918}    FHS-7:   Breast CA Risk Assessment (FHS-7) 10/12/2022   Did any of your first-degree relatives have breast or ovarian cancer? No   Did any of your relatives have bilateral breast cancer? No   Did any man in your family have breast cancer? No   Did any woman in your family have breast and ovarian cancer? No   Did any woman in your family have breast cancer before age 50 y? No   Do you have 2 or more relatives with breast and/or ovarian cancer? No   Do you have 2 or more relatives with breast and/or bowel cancer? No     {If any of the questions to the BCRA (FHS-7) are answered yes, consider ordering referral for genetic counseling (Optional) :693232::\"click delete button to remove this line now\"}  {AMB Mammogram Decision Support (Optional) :786557}  Pertinent mammograms are reviewed under the imaging tab.    Review of Systems   Constitutional: Negative for chills and fever.   HENT: Negative for congestion, ear pain, hearing loss and sore throat.    Eyes: Negative for pain and visual disturbance.   Respiratory: Negative for cough and shortness of breath.    Cardiovascular: Negative for chest pain, palpitations and peripheral edema.   Gastrointestinal: Negative for abdominal pain, constipation, diarrhea, heartburn, hematochezia and nausea.   Breasts:  Negative for tenderness, breast mass and discharge.   Genitourinary: Negative for dysuria, frequency, genital sores, hematuria, pelvic pain, urgency, vaginal bleeding and vaginal discharge.   Musculoskeletal: Positive for arthralgias. Negative for joint swelling and myalgias.   Skin: Negative for rash. " "  Neurological: Negative for dizziness, weakness, headaches and paresthesias.   Psychiatric/Behavioral: Negative for mood changes. The patient is not nervous/anxious.      {ROS COMP (Optional):784099}    OBJECTIVE:   /74 (BP Location: Left arm, Patient Position: Sitting, Cuff Size: Adult Regular)   Pulse 68   Temp 98.3  F (36.8  C) (Oral)   Ht 1.492 m (4' 10.74\")   Wt 51.3 kg (113 lb)   LMP 2011   SpO2 100%   BMI 23.03 kg/m   Estimated body mass index is 23.03 kg/m  as calculated from the following:    Height as of this encounter: 1.492 m (4' 10.74\").    Weight as of this encounter: 51.3 kg (113 lb).  Physical Exam  {Exam (Optional) :856159}    {Diagnostic Test Results (Optional):055252::\"Diagnostic Test Results:\",\"Labs reviewed in Epic\"}    ASSESSMENT / PLAN:   {Diag Picklist:686564}    {Patient advised of split billing (Optional):720245}      COUNSELING:  {Medicare Counselin}        She reports that she has never smoked. She has never used smokeless tobacco.      Appropriate preventive services were discussed with this patient, including applicable screening as appropriate for cardiovascular disease, diabetes, osteopenia/osteoporosis, and glaucoma.  As appropriate for age/gender, discussed screening for colorectal cancer, prostate cancer, breast cancer, and cervical cancer. Checklist reviewing preventive services available has been given to the patient.    Reviewed patients plan of care and provided an AVS. The {CarePlan:572416} for Christine meets the Care Plan requirement. This Care Plan has been established and reviewed with the {PATIENT, FAMILY MEMBER, CAREGIVER:270920}.    {Counseling Resources  [place] Preventive Services Task Force  Cholesterol Screening  Health diet/nutrition  Pooled Cohorts Equation Calculator  USDA's MyPlate  ASA Prophylaxis  Lung CA Screening  Osteoporosis prevention/bone health :946746}  {Breast Cancer Risk Calculator  BRCA-Related Cancer Risk Assessment FHS-7 Tool " :147377Byron Rucker MD  [facility]    Identified Health Risks:

## 2023-02-03 PROCEDURE — 82274 ASSAY TEST FOR BLOOD FECAL: CPT | Performed by: FAMILY MEDICINE

## 2023-02-04 LAB — HEMOCCULT STL QL IA: NEGATIVE

## 2023-06-05 ENCOUNTER — TELEPHONE (OUTPATIENT)
Dept: FAMILY MEDICINE | Facility: CLINIC | Age: 63
End: 2023-06-05
Payer: COMMERCIAL

## 2023-06-05 NOTE — TELEPHONE ENCOUNTER
Patient Quality Outreach    Patient is due for the following:       Topic Date Due     Zoster (Shingles) Vaccine (1 of 2) Never done     Diptheria Tetanus Pertussis (DTAP/TDAP/TD) Vaccine (2 - Td or Tdap) 05/17/2023       Type of outreach:    Sent [employer] message. and Sent letter.    Next Steps:  Reach out within 90 days via MyChart and Letter.    Max number of attempts reached: Yes. Will try again in 90 days if patient still on fail list.    Questions for provider review:    None           Emil Stewart MA  Chart routed to Care Team.

## 2023-06-05 NOTE — LETTER
June 5, 2023    Christine Yadav  [address] [place] [id number]    Dear Christine,    At [facility] we care about your health and are committed to providing quality patient care.     Here is a list of Health Maintenance topics that are due now or due soon:  Health Maintenance Due   Topic Date Due    ZOSTER IMMUNIZATION (1 of 2) Never done    DTAP/TDAP/TD IMMUNIZATION (2 - Td or Tdap) 05/17/2023    PAP FOLLOW-UP  07/10/2023    HPV FOLLOW-UP  07/10/2023        We are recommending that you:  Schedule a Nurse-Only appointment to update your immunizations: Your records indicate that you are not up to date with your immunizations, please schedule a nurse-only appointment to get these updated or update them at your next office visit. If this is incorrect, please disregard.    To schedule an appointment or discuss this further, you may contact us by phone at the [facility] at [phone] or online through the patient [email or web address]    Thank you for trusting [facility] and we appreciate the opportunity to serve you.  We look forward to supporting your healthcare needs in the future.    Your partners in health,      Quality Committee at [facility]

## 2023-09-12 ENCOUNTER — PATIENT OUTREACH (OUTPATIENT)
Dept: CARE COORDINATION | Facility: CLINIC | Age: 63
End: 2023-09-12
Payer: COMMERCIAL

## 2023-09-21 ENCOUNTER — OFFICE VISIT (OUTPATIENT)
Dept: FAMILY MEDICINE | Facility: CLINIC | Age: 63
End: 2023-09-21
Payer: COMMERCIAL

## 2023-09-21 VITALS
WEIGHT: 114.9 LBS | HEART RATE: 64 BPM | RESPIRATION RATE: 16 BRPM | SYSTOLIC BLOOD PRESSURE: 119 MMHG | BODY MASS INDEX: 23.41 KG/M2 | OXYGEN SATURATION: 98 % | DIASTOLIC BLOOD PRESSURE: 76 MMHG | TEMPERATURE: 97.9 F

## 2023-09-21 DIAGNOSIS — H92.01 OTALGIA, RIGHT: Primary | ICD-10-CM

## 2023-09-21 PROCEDURE — 99213 OFFICE O/P EST LOW 20 MIN: CPT | Performed by: INTERNAL MEDICINE

## 2023-09-21 ASSESSMENT — PAIN SCALES - GENERAL: PAINLEVEL: MILD PAIN (3)

## 2023-09-21 NOTE — PROGRESS NOTES
Assessment & Plan     Otalgia, right  The precise etiology of this patient's otalgia is unclear by history and exam, however, I would favor eustachian tube dysfunction as the most likely cause of her pain, I recommended a trial of nasal decongestions to see if this can improve her pain.  I do not see any signs of otitis media, I do not see any signs of otitis externa, I do not think that this is temporomandibular joint dysfunction, there are no signs of mastoiditis or sinusitis.  I recommended over-the-counter Mucinex D as directed.  We discussed the potential risks and side effects.  If that does not improve symptoms by early next week, I recommended that she follow-up.  Contact us sooner if there are new symptoms or worsening symptoms despite the prescribed intervention.      No LOS data to display   Time spent by me doing chart review, history and exam, documentation and further activities per the note           Vasiliy Gimenez MD  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  She has noticed several days of right ear pain.  She has a history of a right tympanic membrane rupture in the late 1990s.  She denies hearing loss, fevers, chills, vertigo, tinnitus.  She did try to clean out her ear with a Q-tip several days ago and noted blood on the tip of the Q-tip.  She denies any other URI symptoms.  However, she does report postnasal drainage that is longstanding as well as mild nasal congestion.      Review of Systems         Objective    /76 (BP Location: Left arm, Patient Position: Sitting, Cuff Size: Adult Regular)   Pulse 64   Temp 97.9  F (36.6  C) (Oral)   Resp 16   Wt 52.1 kg (114  lb 14.4 oz)   LMP 12/01/2011   SpO2 98%   BMI 23.41 kg/m    Body mass index is 23.41 kg/m .  Physical Exam   General: This is a well-appearing, comfortable appearing female in no acute distress.  She does not appear toxic.  HEENT: The left tympanic membrane is normal, the right tympanic membrane has some scars on it likely related to the previous problem that she describes in the late 1990s, but is otherwise clear and there is no fluid behind the tympanic membrane, the external auditory canal looks normal.  The right nostril demonstrates turbinate edema with nasal discharge, the left nostril is less edematous but there is nasal discharge noted, there is no maxillary sinus tenderness, there is no frontal sinus tenderness, the posterior pharynx appears normal, the neck is supple without adenopathy, the thyroid gland feels normal.  No bilateral mastoid tenderness.  Cardiovascular: The heart has a regular rate and rhythm.  Pulmonary: The lungs are clear to auscultation bilaterally, breathing does not appear labored.  Neurological: Alert and oriented to person place and time, cranial nerves II to XII appear grossly intact, normal gait.

## 2023-10-05 DIAGNOSIS — N95.1 SYMPTOMATIC MENOPAUSAL OR FEMALE CLIMACTERIC STATES: ICD-10-CM

## 2023-10-05 NOTE — TELEPHONE ENCOUNTER
Patient called in wondering about a refill of her medroxyprogesterone.  She last saw Dr. Rucker in January.        Patient also wondering if she got a PAP smear at the women[facility] clinic would they do a physical as well.   Encouraged the patient to contact that clinic and ask what is included in their physical.  Patient verbalized understanding.      Routing to refill pool for refill.    Kristina Kjellberg, MSN, RN  [facility] Triage

## 2023-10-05 NOTE — TELEPHONE ENCOUNTER
Routing to last provider she saw for her routine preventive visit. I have not seen this pt in years. Nella Taylor M.D.

## 2023-10-09 ENCOUNTER — TELEPHONE (OUTPATIENT)
Dept: OBGYN | Facility: CLINIC | Age: 63
End: 2023-10-09

## 2023-10-09 NOTE — TELEPHONE ENCOUNTER
[facility] Call Center    Phone Message    May a detailed message be left on voicemail: yes     Reason for Call: Medication Refill Request    Has the patient contacted the pharmacy for the refill? Yes   Name of medication being requested: medroxyPROGESTERone (PROVERA) 2.5 MG  Provider who prescribed the medication: Socrates   [place] [facility] [place] [address] [address]  Date medication is needed: ASAP     Action Taken: Message routed to:  Other: [facility]    Travel Screening: Not Applicable

## 2023-10-09 NOTE — TELEPHONE ENCOUNTER
Patient calling in looking for update on med below. Writer relayed Dr. Montana's message below and let her know med was sent to OBGYN provider to review - patient verbalized understanding. Will reach out to OBGYN to see if they can even fill this - does have appt on 11/10/23 with another provider, but hasn't seen OBGYN since 11/05/2021.    No further questions/concerns for primary care office.      Alejandra Russo, LÓPEZN, RN  [facility]

## 2023-10-09 NOTE — CONFIDENTIAL NOTE
Pt requesting refill for provera.  Next appointment 11/10.  Short term refill? Order is pended by other prescriber.

## 2023-10-10 RX ORDER — MEDROXYPROGESTERONE ACETATE 2.5 MG/1
2.5 TABLET ORAL DAILY
Qty: 90 TABLET | Refills: 1 | Status: SHIPPED | OUTPATIENT
Start: 2023-10-10

## 2023-10-18 ENCOUNTER — HOSPITAL ENCOUNTER (OUTPATIENT)
Dept: MAMMOGRAPHY | Facility: CLINIC | Age: 63
Discharge: HOME | End: 2023-10-18
Attending: FAMILY MEDICINE | Admitting: FAMILY MEDICINE
Payer: COMMERCIAL

## 2023-10-18 DIAGNOSIS — Z12.31 ENCOUNTER FOR SCREENING MAMMOGRAM FOR BREAST CANCER: ICD-10-CM

## 2023-10-18 PROCEDURE — 77063 BREAST TOMOSYNTHESIS BI: CPT

## 2023-11-10 ENCOUNTER — OFFICE VISIT (OUTPATIENT)
Dept: OBGYN | Facility: CLINIC | Age: 63
End: 2023-11-10
Payer: COMMERCIAL

## 2023-11-10 VITALS
SYSTOLIC BLOOD PRESSURE: 136 MMHG | HEART RATE: 65 BPM | OXYGEN SATURATION: 100 % | WEIGHT: 109 LBS | DIASTOLIC BLOOD PRESSURE: 71 MMHG | BODY MASS INDEX: 22.88 KG/M2 | HEIGHT: 58 IN

## 2023-11-10 DIAGNOSIS — N95.1 SYMPTOMATIC MENOPAUSAL OR FEMALE CLIMACTERIC STATES: ICD-10-CM

## 2023-11-10 DIAGNOSIS — Z00.00 ANNUAL PHYSICAL EXAM: Primary | ICD-10-CM

## 2023-11-10 PROCEDURE — 99396 PREV VISIT EST AGE 40-64: CPT | Performed by: OBSTETRICS & GYNECOLOGY

## 2023-11-10 PROCEDURE — 99213 OFFICE O/P EST LOW 20 MIN: CPT | Mod: 25 | Performed by: OBSTETRICS & GYNECOLOGY

## 2023-11-10 PROCEDURE — G0145 SCR C/V CYTO,THINLAYER,RESCR: HCPCS | Performed by: OBSTETRICS & GYNECOLOGY

## 2023-11-10 PROCEDURE — 87624 HPV HI-RISK TYP POOLED RSLT: CPT | Performed by: OBSTETRICS & GYNECOLOGY

## 2023-11-10 RX ORDER — MEDROXYPROGESTERONE ACETATE 2.5 MG/1
2.5 TABLET ORAL DAILY
Qty: 90 TABLET | Refills: 4 | Status: SHIPPED | OUTPATIENT
Start: 2023-11-10

## 2023-11-10 RX ORDER — ESTRADIOL 1 MG/1
1 TABLET ORAL DAILY
Qty: 90 TABLET | Refills: 4 | Status: SHIPPED | OUTPATIENT
Start: 2023-11-10

## 2023-11-10 NOTE — PROGRESS NOTES
"Christine is a 63 year old  who presents for annual exam.   Postmenopausal since 52 year old.  She is having hot flashes, mild. No vaginal bleeding noted.     Besides routine health maintenance, she has no other health concerns today .  Wants to continue HRT.  Had a few day lapse in prescription and had significant symptoms.  Not interested in decreasing dose at this time.  Aware of risks.    Had labs in 2023  GYNECOLOGIC HISTORY:  Menarche: 13   Age at first intercourse: 20 Number of lifetime partners: more than 6  She is not sexually active with 0male partner(s) and she is not currently in monogamous relationship with 0.    History sexually transmitted infections:Chlamydia, Gonorrhea, Herpes, and Trichomonas  STI testing offered?  Declined  Estrogen replacement therapy: Yes -  Oral  ADILENE exposure: No    History of abnormal Pap smear: YES - updated in Problem List and Health Maintenance accordingly  Family history of breast CA: Yes (Please explain): m great aunt, niece has the gland cancer  Family history of uterine/ovarian CA: No  Family history of colon CA: No    HEALTH MAINTENANCE:  Cholesterol: (No components found for: \"CHOL2\" ) History of abnormal lipids: No  Mammo: 10/18/2023 . History of abnormal Mammo: No  Regular Self Breast Exams: Yes  Colonoscopy: 2023 History of abnormal Colonoscopy: No  Dexa:  History of abnormal Dexa: No  Calcium/Vitamin D intake: source:  dairy, dietary supplement(s) Adequate? No  TSH: (No components found for: \"TSH1\" )  Pap; (  Lab Results   Component Value Date    PAP NIL 07/10/2020    PAP NIL 2019    PAP NIL 2018    )    HISTORY:  OB History    Para Term  AB Living   5 4 4 0 1 4   SAB IAB Ectopic Multiple Live Births   1 0 0 0 4      # Outcome Date GA Lbr Juan Pablo/2nd Weight Sex Delivery Anes PTL Lv   5 Term 07/15/91 40w0d       MEENA   4 Term 08/10/90 40w0d    CS   MEENA   3 Term 84 40w0d       MEENA   2 Term 10/02/82 40w0d       MEENA "   1 SAB 80     SAB   DEC     Past Medical History:   Diagnosis Date    ASCUS on Pap smear     + HPV , colp WNL    ASCUS with positive high risk HPV cervical 2007    Followed up with a colp (pulled from [facility])    Cervical high risk human papillomavirus (HPV) DNA test positive , 18    Unable to type. 18: See problem list.     Herpes simplex without mention of complication     no recent outbreaks    Other and unspecified hyperlipidemia     elevated chol, ratio WNL    Papanicolaou smear of cervix with atypical squamous cells of undetermined significance (ASC-US)     +Endometrial cells  and 2002 US neg    Varicella without mention of complication     Chickenpox     Past Surgical History:   Procedure Laterality Date    ABDOMINOPLASTY       SECTION      COLONOSCOPY  14    negative, repeat in 10 years    COLONOSCOPY  2012    [place] - Repeat in 10 years.     hernia  age 3 or 4    hernia repair    HERNIA REPAIR      Between the age of 3 and 4 years. (Pulled from [facility] record)     MAMMOPLASTY AUGMENTATION      MAMMOPLASTY AUGMENTATION BILATERAL      VAGINAL DELIVERY      x's 3     ZZ NONSPECIFIC PROCEDURE  82,84,91    Vaginal delivery x3    ZZC NONSPECIFIC PROCEDURE          ZZ NONSPECIFIC PROCEDURE  83    SAB     Family History   Problem Relation Age of Onset    Respiratory Mother         emphysema    Eye Disorder Mother         glaucoma    Psychotic Disorder Mother         depression    Depression Mother     Cancer Mother     Hypertension Father     Cancer Father         pancreatic CA-  72    Other - See Comments Sister         fibromyalgia    Diabetes Maternal Grandfather     Gastrointestinal Disease Daughter         ulcers    Breast Cancer Maternal Aunt         Unsure what age or outcome    Alcohol/Drug Brother         Alcohol    Liver Disease Brother     Depression Daughter     Other - See Comments Other         similar to  MS    Emphysema Mother     Glaucoma Mother     Pancreatic Cancer Father 72.00    Fibromyalgia Sister     Alcoholism Brother     Liver Disease Brother      Social History     Socioeconomic History    Marital status:      Spouse name: None    Number of children: 4    Years of education: 12    Highest education level: None   Tobacco Use    Smoking status: Never    Smokeless tobacco: Never   Vaping Use    Vaping Use: Never used   Substance and Sexual Activity    Alcohol use: Yes     Comment: 1-2 per month    Drug use: No    Sexual activity: Yes     Partners: Male     Birth control/protection: Surgical, Post-menopausal     Comment: vasectomy   Other Topics Concern     Service No    Blood Transfusions No    Caffeine Concern No    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 No   Social History Narrative    Caffeine intake/servings daily - 0-1 cup of coffee    Calcium intake/servings daily - 0    Exercise 0 times weekly - describe weights, going to start again    Sunscreen used - No    Seatbelts used - Yes    Guns stored in the home - No    Self Breast Exam - No    Pap test up to date -  Yes, as of today    Eye exam up to date -  No    Dental exam up to date -  Yes    DEXA scan up to date -  Not Applicable    Flex Sig/Colonoscopy up to date -  Not Applicable    Mammography up to date -  No    Immunizations reviewed and up to date - Unsure    Abuse: Current or Past (Physical, Sexual or Emotional) - No    Do you feel safe in your environment - Yes    Do you cope well with stress - Yes    Do you suffer from insomnia - No    Last updated by: Natali Osborne MA 9/16/11                           Current  "Outpatient Medications:     estradiol (ESTRACE) 0.5 MG tablet, Take 1 tablet (0.5 mg) by mouth daily, Disp: 90 tablet, Rfl: 3    estradiol (ESTRACE) 0.5 MG tablet, Take 2 tablets (1 mg) by mouth daily, Disp: 30 tablet, Rfl: 3    estradiol (ESTRACE) 1 MG tablet, Take 1 tablet (1 mg) by mouth daily, Disp: 90 tablet, Rfl: 4    medroxyPROGESTERone (PROVERA) 2.5 MG tablet, Take 1 tablet (2.5 mg) by mouth daily, Disp: 90 tablet, Rfl: 4    medroxyPROGESTERone (PROVERA) 2.5 MG tablet, TAKE ONE TABLET BY MOUTH ONCE DAILY, Disp: 90 tablet, Rfl: 1    Probiotic Product (PROBIOTIC PO), Take  by mouth., Disp: , Rfl:      Allergies   Allergen Reactions    No Known Drug Allergy        Past medical, surgical, social and family history were reviewed and updated in EPIC.    ROS:   C:       NEGATIVE for fever, chills, change in weight  I:         NEGATIVE for worrisome rashes, moles or lesions  E:       NEGATIVE for vision changes or irritation  E/M:   NEGATIVE for ear, mouth and throat problems  R:       NEGATIVE for significant cough or SOB  CV:     NEGATIVE for chest pain, palpitations or peripheral edema  GI:      NEGATIVE for nausea, abdominal pain, heartburn, or change in bowel habits  :    NEGATIVE for frequency, dysuria, hematuria, vaginal discharge, or bleeding  M:       NEGATIVE for significant arthralgias or myalgia  N:       NEGATIVE for weakness, dizziness or paresthesias  E:       NEGATIVE for temperature intolerance, skin/hair changes  P:       NEGATIVE for changes in mood or affect    EXAM:  /71   Pulse 65   Ht 1.467 m (4' 9.75\")   Wt 49.4 kg (109 lb)   LMP 12/01/2011   SpO2 100%   BMI 22.98 kg/m     BMI: Body mass index is 22.98 kg/m .  Constitutional: healthy, alert and no distress  Head: Normocephalic. No masses, lesions, tenderness or abnormalities  Neck: Neck supple. Trachea midline. No adenopathy. Thyroid symmetric, normal size.   Cardiovascular: RRR.   Respiratory: Negative.   Breast: BL breast " implants, but no skin changes or masses appreciated.  No LAD  Gastrointestinal: Abdomen soft, non-tender, non-distended. No masses, organomegaly  :  Vulva:  No external lesions, normal female hair distribution, no inguinal adenopathy.    Urethra:  Midline, non-tender, well supported, no discharge  Vagina:  Mildly atrophic, no abnormal discharge, no lesions  Musculoskeletal: extremities normal  Skin: no suspicious lesions or rashes  Psychiatric: Affect appropriate, cooperative,mentation appears normal.     COUNSELING:   Reviewed preventive health counseling, as reflected in patient instructions  Special attention given to:        Immunizations  Declined: Covid-19, TDAP, RSV and Zoster   reports that she has never smoked. She has never used smokeless tobacco.    Body mass index is 22.98 kg/m .      FRAX Risk Assessment  ASSESSMENT:  63 year old  with satisfactory annual exam  (Z00.00) Annual physical exam  (primary encounter diagnosis)  Comment: Pap updated.  Vaccines declined.  HM otherwise UTD.  Plan: Pap screen with HPV - recommended age 30 - 65         years      (N95.1) Symptomatic menopausal or female climacteric states  Comment: Reviewed risks of HRT and recommended trial of decreased dose.  She declines that this year, but will consider decreased dose next year.  Aware of risks.  Plan: estradiol (ESTRACE) 1 MG tablet,         medroxyPROGESTERone (PROVERA) 2.5 MG tablet          RTC 1 year and prn.    Zehra Armenta MD

## 2023-11-15 LAB
BKR LAB AP GYN ADEQUACY: NORMAL
BKR LAB AP GYN INTERPRETATION: NORMAL
BKR LAB AP HPV REFLEX: NORMAL
BKR LAB AP PREVIOUS ABNL DX: NORMAL
BKR LAB AP PREVIOUS ABNORMAL: NORMAL
PATH REPORT.COMMENTS IMP SPEC: NORMAL
PATH REPORT.COMMENTS IMP SPEC: NORMAL
PATH REPORT.RELEVANT HX SPEC: NORMAL

## 2023-11-16 LAB
HUMAN PAPILLOMA VIRUS 16 DNA: NEGATIVE
HUMAN PAPILLOMA VIRUS 18 DNA: NEGATIVE
HUMAN PAPILLOMA VIRUS FINAL DIAGNOSIS: NORMAL
HUMAN PAPILLOMA VIRUS OTHER HR: NEGATIVE

## 2024-09-18 ENCOUNTER — PATIENT OUTREACH (OUTPATIENT)
Dept: CARE COORDINATION | Facility: CLINIC | Age: 64
End: 2024-09-18
Payer: COMMERCIAL

## 2024-10-11 ENCOUNTER — PATIENT OUTREACH (OUTPATIENT)
Dept: CARE COORDINATION | Facility: CLINIC | Age: 64
End: 2024-10-11
Payer: COMMERCIAL

## 2024-10-25 ENCOUNTER — PATIENT OUTREACH (OUTPATIENT)
Dept: CARE COORDINATION | Facility: CLINIC | Age: 64
End: 2024-10-25
Payer: COMMERCIAL

## 2024-11-12 ENCOUNTER — OFFICE VISIT (OUTPATIENT)
Dept: URGENT CARE | Facility: URGENT CARE | Age: 64
End: 2024-11-12
Payer: COMMERCIAL

## 2024-11-12 ENCOUNTER — HOSPITAL ENCOUNTER (OUTPATIENT)
Dept: MAMMOGRAPHY | Facility: CLINIC | Age: 64
Discharge: HOME | End: 2024-11-12
Attending: FAMILY MEDICINE | Admitting: FAMILY MEDICINE
Payer: COMMERCIAL

## 2024-11-12 VITALS
OXYGEN SATURATION: 100 % | SYSTOLIC BLOOD PRESSURE: 118 MMHG | BODY MASS INDEX: 24.35 KG/M2 | HEART RATE: 67 BPM | TEMPERATURE: 98.1 F | DIASTOLIC BLOOD PRESSURE: 76 MMHG | WEIGHT: 115.5 LBS

## 2024-11-12 DIAGNOSIS — H69.93 DYSFUNCTION OF BOTH EUSTACHIAN TUBES: ICD-10-CM

## 2024-11-12 DIAGNOSIS — Z12.31 VISIT FOR SCREENING MAMMOGRAM: ICD-10-CM

## 2024-11-12 DIAGNOSIS — J01.00 ACUTE NON-RECURRENT MAXILLARY SINUSITIS: Primary | ICD-10-CM

## 2024-11-12 PROCEDURE — 99213 OFFICE O/P EST LOW 20 MIN: CPT | Performed by: NURSE PRACTITIONER

## 2024-11-12 PROCEDURE — 77067 SCR MAMMO BI INCL CAD: CPT

## 2024-11-12 RX ORDER — FLUTICASONE PROPIONATE 50 MCG
1 SPRAY, SUSPENSION (ML) NASAL DAILY
Qty: 16 G | Refills: 0 | Status: SHIPPED | OUTPATIENT
Start: 2024-11-12 | End: 2024-12-12

## 2024-11-12 ASSESSMENT — ENCOUNTER SYMPTOMS
CARDIOVASCULAR NEGATIVE: 1
FATIGUE: 1
FEVER: 0
SINUS PRESSURE: 1
RESPIRATORY NEGATIVE: 1
EYES NEGATIVE: 1
SORE THROAT: 1
SINUS PAIN: 1

## 2024-11-12 NOTE — PATIENT INSTRUCTIONS
Augmentin 875/125 one tablet twice daily for 10 days.    Flonase nasal spray one spray in each nostril twice daily for 14 days then once daily for the next 14 days after that    Increase fluids with water, Pedialyte, Gatorade, or rehydrating beverages.     Alternate Tylenol and Ibuprofen as needed for aches, pains or fever.     If needed, follow soft food diet. Rest as much as possible. Run humidifier at night. Gargle with hot salty water. Have warm tea or water with honey. Follow up in clinic if symptoms persist or worsen.

## 2024-11-12 NOTE — PROGRESS NOTES
"Assessment & Plan       ICD-10-CM    1. Acute non-recurrent maxillary sinusitis  J01.00 amoxicillin-clavulanate (AUGMENTIN) 875-125 MG tablet     fluticasone (FLONASE) 50 MCG/ACT nasal spray      2. Dysfunction of both eustachian tubes  H69.93       Fluticasone nasal spray for eustachian tubes as well.        Patient Instructions   Augmentin 875/125 one tablet twice daily for 10 days.    Flonase nasal spray one spray in each nostril twice daily for 14 days then once daily for the next 14 days after that    Increase fluids with water, Pedialyte, Gatorade, or rehydrating beverages.     Alternate Tylenol and Ibuprofen as needed for aches, pains or fever.     If needed, follow soft food diet. Rest as much as possible. Run humidifier at night. Gargle with hot salty water. Have warm tea or water with honey. Follow up in clinic if symptoms persist or worsen.     Follow up withany problems, questions or concerns or if symptoms worsen or fail to improve. Patient agreed to the treatment plan and verbalized understanding.       Bri King is a 64 year old female who presents to clinic today for the following health issues:  Chief Complaint   Patient presents with    Urgent Care    Otalgia    Nasal Congestion     Both ears plugged mainly right. Right ear is starting to hurt. Feels congestion does not want to come out. Afraid of her ear drum rupturing due to this happening the past. Slight cough and sore throat starting. No fever. Tried Vicks, Dayquil and an allergy medication and nothing is helping. She also does not feel clear headed.     HPI  Patient has had nasal drainage and sinus congestion over the last couple of weeks.  She feels her symptoms are worsening and now both ears feel\" plugged\", right worse than left.  She states in the past she has had her eardrum rupture and is afraid of this happening again.  She denies any known fevers.  She states she has tried Vicks, DayQuil, and allergy medications with " minimal relief of her symptoms.       Review of Systems   Constitutional:  Positive for fatigue. Negative for fever.   HENT:  Positive for congestion, ear pain (Right worse than left, although patient states it started out as just a fullness), sinus pressure, sinus pain (Right maxillary worse than left) and sore throat (Minimal).    Eyes: Negative.    Respiratory: Negative.     Cardiovascular: Negative.        Problem List:  2013-05: Menopause syndrome  2010-05: CARDIOVASCULAR SCREENING; LDL GOAL LESS THAN 160  2007-11: Spotting complicating pregnancy  2007-07: Cervical high risk human papillomavirus (HPV) DNA test   positive  Cervical high risk human papillomavirus (HPV) DNA test positive  Herpes simplex virus (HSV) infection  Contraceptive management      Past Medical History:   Diagnosis Date    ASCUS on Pap smear 7/07    + HPV , colp WNL    ASCUS with positive high risk HPV cervical 07/2007    Followed up with a colp (pulled from [facility])    Cervical high risk human papillomavirus (HPV) DNA test positive 6/05, 06/22/18    Unable to type. 06/22/18: See problem list.     Herpes simplex without mention of complication     no recent outbreaks    Other and unspecified hyperlipidemia     elevated chol, ratio WNL    Papanicolaou smear of cervix with atypical squamous cells of undetermined significance (ASC-US) 6/05    +Endometrial cells 12/06 and 6/06, 2002 US neg    Varicella without mention of complication     Chickenpox         Social History     Tobacco Use    Smoking status: Never    Smokeless tobacco: Never   Substance Use Topics    Alcohol use: Yes     Comment: 1-2 per month           Objective    /76   Pulse 67   Temp 98.1  F (36.7  C) (Tympanic)   Wt 52.4 kg (115 lb 8 oz)   LMP 12/01/2011   SpO2 100%   BMI 24.35 kg/m    Physical Exam  Vitals and nursing note reviewed.   Constitutional:       General: She is not in acute distress.     Appearance: Normal appearance.   HENT:      Head:  Normocephalic and atraumatic.      Comments: Right maxillary sinus tender to palpation more so than left.     Right Ear: Ear canal normal.      Left Ear: Ear canal normal.      Ears:      Comments: Clear to yellow fluid behind the TMs, bilaterally.  No erythema although the right is bulging.     Nose: Congestion (Yellow mucus) present.      Mouth/Throat:      Mouth: Mucous membranes are moist.      Pharynx: Oropharynx is clear. Posterior oropharyngeal erythema (Minimal) present. No oropharyngeal exudate.   Eyes:      Extraocular Movements: Extraocular movements intact.      Conjunctiva/sclera: Conjunctivae normal.      Pupils: Pupils are equal, round, and reactive to light.   Cardiovascular:      Rate and Rhythm: Normal rate and regular rhythm.      Heart sounds: Normal heart sounds.   Pulmonary:      Effort: Pulmonary effort is normal.      Breath sounds: Normal breath sounds.   Musculoskeletal:      Cervical back: Normal range of motion and neck supple.   Lymphadenopathy:      Cervical: No cervical adenopathy.   Neurological:      Mental Status: She is alert.              Kathrin Jeffers NP

## 2024-11-19 ENCOUNTER — TELEPHONE (OUTPATIENT)
Dept: OBGYN | Facility: CLINIC | Age: 64
End: 2024-11-19
Payer: COMMERCIAL

## 2024-11-19 DIAGNOSIS — N95.1 SYMPTOMATIC MENOPAUSAL OR FEMALE CLIMACTERIC STATES: ICD-10-CM

## 2024-11-19 RX ORDER — MEDROXYPROGESTERONE ACETATE 2.5 MG/1
2.5 TABLET ORAL DAILY
Qty: 30 TABLET | Refills: 0 | Status: SHIPPED | OUTPATIENT
Start: 2024-11-19

## 2024-11-19 RX ORDER — ESTRADIOL 1 MG/1
1 TABLET ORAL DAILY
Qty: 30 TABLET | Refills: 0 | Status: SHIPPED | OUTPATIENT
Start: 2024-11-19

## 2024-11-19 RX ORDER — ESTRADIOL 1 MG/1
1 TABLET ORAL DAILY
Qty: 90 TABLET | Refills: 4 | OUTPATIENT
Start: 2024-11-19

## 2024-11-19 NOTE — TELEPHONE ENCOUNTER
Patient called and discussed we will send in temp refill.  Advised to discuss with PCP to take over prescription management.  Patient wondering if she will need a visit if PCP does not take over management.  RN advised likely will need a virtual or e-visit to discuss refills with prescribing provider but would not need a full annual/clinic visit.    Emiliana Santiago RN

## 2024-11-19 NOTE — TELEPHONE ENCOUNTER
[facility] Call Center    Phone Message    May a detailed message be left on voicemail: yes     Reason for Call: Other: Pt was calling in regards to a recent refill request. Pt needs to schedule an appt for further refills. Pt states she has an appt with her PCP on 12/6 and would like to have her PCP take over the Rx for Estradiol. Pt refused to make an appt with OBGYN for an annual for the medication refill. Pt was very frustrated and does not feel like she needs an appt to get a refill since she has an annual with her PCP coming up. A message was sent via secure chat  and sending TE per Celsa WHITAKER. Please call pt back to discuss.      Action Taken: Other: OBGYN    Travel Screening: Not Applicable     Date of Service:

## 2024-11-19 NOTE — TELEPHONE ENCOUNTER
Last office visit 11/10/23, patient needs visit for refills.   Message sent to patient.    Wendy UMANA RN   [facility] Triage RN

## 2024-11-19 NOTE — TELEPHONE ENCOUNTER
Patient's med list shows patient is not taking estradiol 1mg tablets. However patient specifically requested the refill of 1mg tablets and is shown that last time she received 3 months supply at the pharmacy was 8/2024. Please review this information and send a new prescription to pharmacy if this request is appropriate.     Thank you,  Coni Barrow, PharmD  [facility]

## 2024-11-19 NOTE — TELEPHONE ENCOUNTER
Pended refill for 1 month supply to bridge to her appointment with PCP.  Please confirm pharmacy and sign.    Zehra Armenta MD

## 2024-12-20 ENCOUNTER — ANCILLARY PROCEDURE (OUTPATIENT)
Dept: ULTRASOUND IMAGING | Facility: CLINIC | Age: 64
End: 2024-12-20
Attending: FAMILY MEDICINE
Payer: COMMERCIAL

## 2024-12-20 DIAGNOSIS — R22.9 LOCALIZED MASS: ICD-10-CM

## 2024-12-20 PROCEDURE — 76705 ECHO EXAM OF ABDOMEN: CPT | Mod: TC | Performed by: INTERNAL MEDICINE

## 2025-05-24 ENCOUNTER — ANCILLARY PROCEDURE (OUTPATIENT)
Dept: GENERAL RADIOLOGY | Facility: CLINIC | Age: 65
End: 2025-05-24
Attending: PHYSICIAN ASSISTANT
Payer: COMMERCIAL

## 2025-05-24 ENCOUNTER — OFFICE VISIT (OUTPATIENT)
Dept: URGENT CARE | Facility: URGENT CARE | Age: 65
End: 2025-05-24
Payer: COMMERCIAL

## 2025-05-24 VITALS
SYSTOLIC BLOOD PRESSURE: 133 MMHG | TEMPERATURE: 98.5 F | RESPIRATION RATE: 14 BRPM | WEIGHT: 114.2 LBS | BODY MASS INDEX: 24.43 KG/M2 | HEART RATE: 84 BPM | OXYGEN SATURATION: 100 % | DIASTOLIC BLOOD PRESSURE: 79 MMHG

## 2025-05-24 DIAGNOSIS — R05.8 PRODUCTIVE COUGH: ICD-10-CM

## 2025-05-24 DIAGNOSIS — J02.9 SORE THROAT: Primary | ICD-10-CM

## 2025-05-24 DIAGNOSIS — J06.9 VIRAL UPPER RESPIRATORY TRACT INFECTION WITH COUGH: ICD-10-CM

## 2025-05-24 LAB
DEPRECATED S PYO AG THROAT QL EIA: NEGATIVE
S PYO DNA THROAT QL NAA+PROBE: NOT DETECTED

## 2025-05-24 PROCEDURE — 71046 X-RAY EXAM CHEST 2 VIEWS: CPT | Mod: TC | Performed by: RADIOLOGY

## 2025-05-24 PROCEDURE — 3075F SYST BP GE 130 - 139MM HG: CPT | Performed by: PHYSICIAN ASSISTANT

## 2025-05-24 PROCEDURE — 99214 OFFICE O/P EST MOD 30 MIN: CPT | Performed by: PHYSICIAN ASSISTANT

## 2025-05-24 PROCEDURE — 3078F DIAST BP <80 MM HG: CPT | Performed by: PHYSICIAN ASSISTANT

## 2025-05-24 PROCEDURE — 87651 STREP A DNA AMP PROBE: CPT | Performed by: PHYSICIAN ASSISTANT

## 2025-05-24 RX ORDER — BENZONATATE 200 MG/1
200 CAPSULE ORAL 3 TIMES DAILY PRN
Qty: 40 CAPSULE | Refills: 0 | Status: SHIPPED | OUTPATIENT
Start: 2025-05-24

## 2025-05-24 ASSESSMENT — ENCOUNTER SYMPTOMS
MUSCULOSKELETAL NEGATIVE: 1
EYES NEGATIVE: 1
DIARRHEA: 0
JOINT SWELLING: 0
MYALGIAS: 0
FEVER: 0
ALLERGIC/IMMUNOLOGIC NEGATIVE: 1
BACK PAIN: 0
ARTHRALGIAS: 0
NAUSEA: 0
NECK PAIN: 0
SORE THROAT: 1
HEADACHES: 0
WOUND: 0
WEAKNESS: 0
ENDOCRINE NEGATIVE: 1
SHORTNESS OF BREATH: 0
VOMITING: 0
PALPITATIONS: 0
DIZZINESS: 0
CHILLS: 0
LIGHT-HEADEDNESS: 0
NECK STIFFNESS: 0
COUGH: 1
RHINORRHEA: 0
CARDIOVASCULAR NEGATIVE: 1

## 2025-05-24 NOTE — PROGRESS NOTES
Chief Complaint:     Chief Complaint   Patient presents with    Cough     PT HAD A COLD A COUPLE WEEKS AGO, SEEMED TO CLEAR UP.  5 DAYS LATER ENDED UP WITH PRODUCTIVE COUGH, UNABLETO SLEEP AT NIGHT, 2 HRS COUGHING SPELLS, SORE THROAT ON THE LEFT SIDE, ONSET THURSDAY - MOVED UP INTO LEFT EAR LAST NIGHT     Pharyngitis    Otalgia       Results for orders placed or performed in visit on 05/24/25   XR Chest 2 Views     Status: None    Narrative    EXAM: XR CHEST 2 VIEWS  LOCATION: [facility]  DATE: 5/24/2025    INDICATION:  Productive cough  COMPARISON: 5/17/2013.      Impression    IMPRESSION: No pleural fluid or pneumothorax. No airspace disease or edema. Normal size of the heart.   Results for orders placed or performed in visit on 05/24/25   Streptococcus A Rapid Screen w/Reflex to PCR - Clinic Collect     Status: Normal    Specimen: Throat; Swab   Result Value Ref Range    Group A Strep antigen Negative Negative       Medical Decision Making:    Vital signs reviewed by Zurdo Reeves PA-C  /79 (BP Location: Left arm, Patient Position: Sitting, Cuff Size: Adult Small)   Pulse 84   Temp 98.5  F (36.9  C) (Tympanic)   Resp 14   Wt 51.8 kg (114 lb 3.2 oz)   LMP 12/01/2011   SpO2 100%   BMI 24.43 kg/m      Differential Diagnosis:  URI Adult/Peds:  Bronchitis-viral, Pneumonia, Sinusitis, Strep pharyngitis, Tonsilitis, Viral pharyngitis, Viral syndrome, and Viral upper respiratory illness        ASSESSMENT    1. Sore throat    2. Productive cough    3. Viral upper respiratory tract infection with cough        PLAN    Patient is in no acute distress.    Temp is 98.5 in clinic today, lung sounds were clear, and O2 sats at 100% on RA.    CXR was negative for any acute infiltrates or consolidations per my read.    RST was negative.  We will call with PCR results only if positive.  Rest, Push fluids, vaporizer, elevation of head of bed.  Ibuprofen and or Tylenol for any fever or body  aches.  Rx for Tessalon cough suppressant- PRN- as discussed.   If symptoms worsen, recheck immediately otherwise follow up with your PCP in 1 week if symptoms are not improving.  Worrisome symptoms discussed with instructions to go to the ED.  Patient verbalized understanding and agreed with this plan.    Labs:    Results for orders placed or performed in visit on 05/24/25   XR Chest 2 Views     Status: None    Narrative    EXAM: XR CHEST 2 VIEWS  LOCATION: [facility]  DATE: 5/24/2025    INDICATION:  Productive cough  COMPARISON: 5/17/2013.      Impression    IMPRESSION: No pleural fluid or pneumothorax. No airspace disease or edema. Normal size of the heart.   Results for orders placed or performed in visit on 05/24/25   Streptococcus A Rapid Screen w/Reflex to PCR - Clinic Collect     Status: Normal    Specimen: Throat; Swab   Result Value Ref Range    Group A Strep antigen Negative Negative        Vital signs reviewed by Zurdo Reeves PA-C  /79 (BP Location: Left arm, Patient Position: Sitting, Cuff Size: Adult Small)   Pulse 84   Temp 98.5  F (36.9  C) (Tympanic)   Resp 14   Wt 51.8 kg (114 lb 3.2 oz)   LMP 12/01/2011   SpO2 100%   BMI 24.43 kg/m      Current Meds      Current Outpatient Medications:     benzonatate (TESSALON) 200 MG capsule, Take 1 capsule (200 mg) by mouth 3 times daily as needed for cough., Disp: 40 capsule, Rfl: 0    estradiol (ESTRACE) 1 MG tablet, Take 1 tablet (1 mg) by mouth daily., Disp: 90 tablet, Rfl: 2    estradiol (ESTRACE) 1 MG tablet, Take 1 tablet (1 mg) by mouth daily, Disp: 90 tablet, Rfl: 4    medroxyPROGESTERone (PROVERA) 2.5 MG tablet, Take 1 tablet (2.5 mg) by mouth daily., Disp: 90 tablet, Rfl: 2    medroxyPROGESTERone (PROVERA) 2.5 MG tablet, Take 1 tablet (2.5 mg) by mouth daily., Disp: 30 tablet, Rfl: 0    Probiotic Product (PROBIOTIC PO), Take  by mouth., Disp: , Rfl:     vitamin B complex with vitamin C (VITAMIN  B COMPLEX)  tablet, Take 1 tablet by mouth daily., Disp: , Rfl:       Respiratory History    occasional episodes of bronchitis      SUBJECTIVE    HPI: Christine Yadav is an 65 year old female who presents with chest congestion, cough productive, occasional, and sore throat.  Symptoms began 1  weeks ago and has gradually worsening.  There is no shortness of breath, wheezing, and chest pain.  Patient is eating and drinking well.  No fever, nausea, vomiting, or diarrhea.    Patient denies any recent travel or exposure to known COVID positive tested individual.      ROS:     Review of Systems   Constitutional:  Negative for chills and fever.   HENT:  Positive for congestion and sore throat. Negative for ear pain and rhinorrhea.    Eyes: Negative.    Respiratory:  Positive for cough. Negative for shortness of breath.    Cardiovascular: Negative.  Negative for chest pain and palpitations.   Gastrointestinal:  Negative for diarrhea, nausea and vomiting.   Endocrine: Negative.    Genitourinary: Negative.    Musculoskeletal: Negative.  Negative for arthralgias, back pain, joint swelling, myalgias, neck pain and neck stiffness.   Skin: Negative.  Negative for rash and wound.   Allergic/Immunologic: Negative.  Negative for immunocompromised state.   Neurological:  Negative for dizziness, weakness, light-headedness and headaches.         Family History   Family History   Problem Relation Age of Onset    Respiratory Mother         emphysema    Eye Disorder Mother         glaucoma    Psychotic Disorder Mother         depression    Depression Mother     Cancer Mother     Hypertension Father     Cancer Father         pancreatic CA-  72    Other - See Comments Sister         fibromyalgia    Diabetes Maternal Grandfather     Gastrointestinal Disease Daughter         ulcers    Breast Cancer Maternal Aunt         Unsure what age or outcome    Alcohol/Drug Brother         Alcohol    Liver Disease Brother     Depression Daughter     Other - See  Comments Other         similar to MS    Emphysema Mother     Glaucoma Mother     Pancreatic Cancer Father 72.00    Fibromyalgia Sister     Alcoholism Brother     Liver Disease Brother         Problem history  Patient Active Problem List   Diagnosis    Herpes simplex virus (HSV) infection    Contraceptive management    Cervical high risk human papillomavirus (HPV) DNA test positive    CARDIOVASCULAR SCREENING; LDL GOAL LESS THAN 160    Menopause syndrome        Allergies  Allergies   Allergen Reactions    No Known Drug Allergy         Social History  Social History     Socioeconomic History    Marital status:      Spouse name: Not on file    Number of children: 4    Years of education: 12    Highest education level: Not on file   Occupational History    Not on file   Tobacco Use    Smoking status: Never    Smokeless tobacco: Never   Vaping Use    Vaping status: Never Used   Substance and Sexual Activity    Alcohol use: Yes     Comment: 1-2 per month    Drug use: No    Sexual activity: Yes     Partners: Male     Birth control/protection: Surgical, Post-menopausal     Comment: vasectomy   Other Topics Concern     Service No    Blood Transfusions No    Caffeine Concern No    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 No   Social History Narrative    Caffeine intake/servings daily - 0-1 cup of coffee    Calcium intake/servings daily - 0    Exercise 0 times weekly - describe weights, going to start again    Sunscreen used - No    Seatbelts used - Yes    Guns stored in the home - No    Self Breast Exam - No    Pap test up to date -  Yes, as of today    Eye exam up to date -  No    Dental exam  up to date -  Yes    DEXA scan up to date -  Not Applicable    Flex Sig/Colonoscopy up to date -  Not Applicable    Mammography up to date -  No    Immunizations reviewed and up to date - Unsure    Abuse: Current or Past (Physical, Sexual or Emotional) - No    Do you feel safe in your environment - Yes    Do you cope well with stress - Yes    Do you suffer from insomnia - No    Last updated by: Natali Osborne MA 9/16/11                         Social Drivers of Health     Financial Resource Strain: Low Risk  (12/5/2024)    Financial Resource Strain     Within the past 12 months, have you or your family members you live with been unable to get utilities (heat, electricity) when it was really needed?: No   Food Insecurity: Low Risk  (12/5/2024)    Food Insecurity     Within the past 12 months, did you worry that your food would run out before you got money to buy more?: No     Within the past 12 months, did the food you bought just not last and you didn t have money to get more?: No   Transportation Needs: Low Risk  (12/5/2024)    Transportation Needs     Within the past 12 months, has lack of transportation kept you from medical appointments, getting your medicines, non-medical meetings or appointments, work, or from getting things that you need?: No   Physical Activity: Inactive (12/5/2024)    Exercise Vital Sign     Days of Exercise per Week: 0 days     Minutes of Exercise per Session: 0 min   Stress: Stress Concern Present (12/5/2024)    [employer] - Occupational Stress Questionnaire     Feeling of Stress : To some extent   Social Connections: Unknown (12/5/2024)    Social Connection and Isolation Panel [NHANES]     Frequency of Communication with Friends and Family: Not on file     Frequency of Social Gatherings with Friends and Family: Patient declined     Attends Baptism Services: Not on file     Active Member of Clubs or Organizations: Not on file     Attends Club or Organization  Meetings: Not on file     Marital Status: Not on file   Interpersonal Safety: Low Risk  (12/6/2024)    Interpersonal Safety     Do you feel physically and emotionally safe where you currently live?: Yes     Within the past 12 months, have you been hit, slapped, kicked or otherwise physically hurt by someone?: No     Within the past 12 months, have you been humiliated or emotionally abused in other ways by your partner or ex-partner?: No   Housing Stability: Low Risk  (12/5/2024)    Housing Stability     Do you have housing? : Yes     Are you worried about losing your housing?: No        OBJECTIVE     Vital signs reviewed by Zurdo Reeves PA-C  /79 (BP Location: Left arm, Patient Position: Sitting, Cuff Size: Adult Small)   Pulse 84   Temp 98.5  F (36.9  C) (Tympanic)   Resp 14   Wt 51.8 kg (114 lb 3.2 oz)   LMP 12/01/2011   SpO2 100%   BMI 24.43 kg/m       Physical Exam  Vitals and nursing note reviewed.   Constitutional:       General: She is not in acute distress.     Appearance: She is well-developed. She is not ill-appearing, toxic-appearing or diaphoretic.   HENT:      Head: Normocephalic and atraumatic.      Right Ear: Hearing, tympanic membrane, ear canal and external ear normal. Tympanic membrane is not perforated, erythematous, retracted or bulging.      Left Ear: Hearing, tympanic membrane, ear canal and external ear normal. Tympanic membrane is not perforated, erythematous, retracted or bulging.      Nose: Congestion present. No mucosal edema or rhinorrhea.      Right Sinus: No maxillary sinus tenderness or frontal sinus tenderness.      Left Sinus: No maxillary sinus tenderness or frontal sinus tenderness.      Mouth/Throat:      Pharynx: Posterior oropharyngeal erythema present. No pharyngeal swelling, oropharyngeal exudate or uvula swelling.      Tonsils: No tonsillar exudate or tonsillar abscesses. 0 on the right. 0 on the left.   Eyes:      General:         Right eye: No discharge.          Left eye: No discharge.      Pupils: Pupils are equal, round, and reactive to light.   Cardiovascular:      Rate and Rhythm: Normal rate and regular rhythm.      Heart sounds: Normal heart sounds. No murmur heard.     No friction rub. No gallop.   Pulmonary:      Effort: Pulmonary effort is normal. No respiratory distress.      Breath sounds: Normal breath sounds. No decreased breath sounds, wheezing, rhonchi or rales.   Chest:      Chest wall: No tenderness.   Abdominal:      General: Bowel sounds are normal. There is no distension.      Palpations: Abdomen is soft. There is no mass.      Tenderness: There is no abdominal tenderness. There is no guarding.   Musculoskeletal:      Cervical back: Normal range of motion and neck supple.   Lymphadenopathy:      Head:      Right side of head: No submental, submandibular, tonsillar, preauricular or posterior auricular adenopathy.      Left side of head: No submental, submandibular, tonsillar, preauricular or posterior auricular adenopathy.      Cervical: No cervical adenopathy.      Right cervical: No superficial or posterior cervical adenopathy.     Left cervical: No superficial or posterior cervical adenopathy.   Skin:     General: Skin is warm and dry.      Findings: No rash.   Neurological:      Mental Status: She is alert and oriented to person, place, and time.      Cranial Nerves: No cranial nerve deficit.      Deep Tendon Reflexes: Reflexes are normal and symmetric.   Psychiatric:         Behavior: Behavior normal. Behavior is cooperative.         Thought Content: Thought content normal.         Judgment: Judgment normal.           Zurdo Reeves PA-C  5/24/2025, 9:17 AM

## 2025-05-24 NOTE — PROGRESS NOTES
Urgent Care Clinic Visit    Chief Complaint   Patient presents with    Cough     PT HAD A COLD A COUPLE WEEKS AGO, SEEMED TO CLEAR UP.  5 DAYS LATER ENDED UP WITH PRODUCTIVE COUGH, UNABLETO SLEEP AT NIGHT, 2 HRS COUGHING SPELLS, SORE THROAT ON THE LEFT SIDE, ONSET THURSDAY - MOVED UP INTO LEFT EAR LAST NIGHT     Pharyngitis    Otalgia               5/24/2025     9:12 AM   Additional Questions   Roomed by Yudy Canada   Accompanied by self         5/24/2025   Forms   Any forms needing to be completed Yes     No  Does the patient have a sore throat and either history of fever >100.4 in the previous 24 hours without a cough or recent exposure to a known case of strep throat? No

## 2025-08-04 ENCOUNTER — TELEPHONE (OUTPATIENT)
Dept: FAMILY MEDICINE | Facility: CLINIC | Age: 65
End: 2025-08-04
Payer: COMMERCIAL

## 2025-08-07 ENCOUNTER — TELEPHONE (OUTPATIENT)
Dept: FAMILY MEDICINE | Facility: CLINIC | Age: 65
End: 2025-08-07
Payer: COMMERCIAL